# Patient Record
Sex: MALE | Race: BLACK OR AFRICAN AMERICAN | NOT HISPANIC OR LATINO
[De-identification: names, ages, dates, MRNs, and addresses within clinical notes are randomized per-mention and may not be internally consistent; named-entity substitution may affect disease eponyms.]

---

## 2019-04-02 ENCOUNTER — APPOINTMENT (OUTPATIENT)
Dept: HEART AND VASCULAR | Facility: CLINIC | Age: 84
End: 2019-04-02
Payer: MEDICARE

## 2019-04-02 PROCEDURE — 99213 OFFICE O/P EST LOW 20 MIN: CPT

## 2019-07-11 ENCOUNTER — APPOINTMENT (OUTPATIENT)
Dept: UROLOGY | Facility: CLINIC | Age: 84
End: 2019-07-11

## 2019-07-18 ENCOUNTER — APPOINTMENT (OUTPATIENT)
Dept: UROLOGY | Facility: CLINIC | Age: 84
End: 2019-07-18
Payer: MEDICARE

## 2019-07-18 DIAGNOSIS — R97.20 ELEVATED PROSTATE, SPECIFIC ANTIGEN [PSA]: ICD-10-CM

## 2019-07-18 PROCEDURE — 99204 OFFICE O/P NEW MOD 45 MIN: CPT

## 2019-07-18 NOTE — HISTORY OF PRESENT ILLNESS
[FreeTextEntry1] : 89M CAD BPH referred with PSA 13. comes in with complaints of penile pain and tenderness. occasionl pain. rare dysuria. no hematuria. no fevers chills. no nausea vomting. no family history prostte kidney bladder cancer. on hytrin 10 daily

## 2019-07-18 NOTE — ASSESSMENT
[FreeTextEntry1] : elevated PSA In a 89 year old\par poor PSA screening candidate\par unclear if harboring prostate cancer\par if he is - would be a good watchful wating candidate regardless\par will repeat PSA 6-12 months to confirm trend\par based on trend may consider further diagnostic testing if needed only\par trial betamethasone\par f/u 6months

## 2019-07-18 NOTE — LETTER BODY
[Dear  ___] : Dear  [unfilled], [FreeTextEntry2] : Ramesh Venegas MD\par [address] \[place] [address] [FreeTextEntry1] : I had the pleasure of seeing your patient Darnell Simon in the office in consultation today. Please see the attached note for full details.\par \par Thank you very much for allowing me to participate in the care of this patient. If you have any questions please feel free to call me at any time. \par \par \par Sincerely yours,\par \par \par \par Jose Peck MD, FELICE\par Director, Male Fertility and Microsurgery\par  of Urology\par [facility]\par \par \par This letter has been dictated using computer software but not reviewed to expedite transmission.\par

## 2019-07-18 NOTE — PHYSICAL EXAM
[General Appearance - Well Developed] : well developed [General Appearance - Well Nourished] : well nourished [Normal Appearance] : normal appearance [Well Groomed] : well groomed [Heart Rate And Rhythm] : Heart rate and rhythm were normal [] : no respiratory distress [Abdomen Soft] : soft [Abdomen Tenderness] : non-tender [Abdomen Hernia] : no hernia was discovered [Costovertebral Angle Tenderness] : no ~M costovertebral angle tenderness [Urethral Meatus] : meatus normal [Urinary Bladder Findings] : the bladder was normal on palpation [Scrotum] : the scrotum was normal [Normal Station and Gait] : the gait and station were normal for the patient's age [Skin Color & Pigmentation] : normal skin color and pigmentation [No Focal Deficits] : no focal deficits [Oriented To Time, Place, And Person] : oriented to person, place, and time [No Palpable Adenopathy] : no palpable adenopathy [FreeTextEntry1] : tight phimosis

## 2019-07-24 ENCOUNTER — MEDICATION RENEWAL (OUTPATIENT)
Age: 84
End: 2019-07-24

## 2019-10-18 ENCOUNTER — INPATIENT (INPATIENT)
Facility: HOSPITAL | Age: 84
LOS: 5 days | Discharge: NURSING, REHAB OR LONG-TERM CARE | DRG: 445 | End: 2019-10-24
Attending: GENERAL ACUTE CARE HOSPITAL | Admitting: GENERAL ACUTE CARE HOSPITAL
Payer: MEDICARE

## 2019-10-18 VITALS
HEIGHT: 65 IN | WEIGHT: 160.06 LBS | DIASTOLIC BLOOD PRESSURE: 50 MMHG | OXYGEN SATURATION: 98 % | RESPIRATION RATE: 16 BRPM | HEART RATE: 101 BPM | SYSTOLIC BLOOD PRESSURE: 87 MMHG | TEMPERATURE: 97 F

## 2019-10-18 DIAGNOSIS — R33.9 RETENTION OF URINE, UNSPECIFIED: ICD-10-CM

## 2019-10-18 DIAGNOSIS — K81.0 ACUTE CHOLECYSTITIS: ICD-10-CM

## 2019-10-18 DIAGNOSIS — B96.20 UNSPECIFIED ESCHERICHIA COLI [E. COLI] AS THE CAUSE OF DISEASES CLASSIFIED ELSEWHERE: ICD-10-CM

## 2019-10-18 DIAGNOSIS — R78.81 BACTEREMIA: ICD-10-CM

## 2019-10-18 LAB
ALBUMIN SERPL ELPH-MCNC: 3.3 G/DL — SIGNIFICANT CHANGE UP (ref 3.3–5)
ALP SERPL-CCNC: 270 U/L — HIGH (ref 40–120)
ALT FLD-CCNC: 355 U/L — HIGH (ref 10–45)
ANION GAP SERPL CALC-SCNC: 16 MMOL/L — SIGNIFICANT CHANGE UP (ref 5–17)
APPEARANCE UR: CLEAR — SIGNIFICANT CHANGE UP
APTT BLD: 36.5 SEC — HIGH (ref 27.5–36.3)
AST SERPL-CCNC: 360 U/L — HIGH (ref 10–40)
BASOPHILS # BLD AUTO: 0 K/UL — SIGNIFICANT CHANGE UP (ref 0–0.2)
BASOPHILS NFR BLD AUTO: 0 % — SIGNIFICANT CHANGE UP (ref 0–2)
BILIRUB SERPL-MCNC: 1.9 MG/DL — HIGH (ref 0.2–1.2)
BILIRUB UR-MCNC: ABNORMAL
BUN SERPL-MCNC: 40 MG/DL — HIGH (ref 7–23)
CALCIUM SERPL-MCNC: 8.4 MG/DL — SIGNIFICANT CHANGE UP (ref 8.4–10.5)
CHLORIDE SERPL-SCNC: 108 MMOL/L — SIGNIFICANT CHANGE UP (ref 96–108)
CO2 SERPL-SCNC: 18 MMOL/L — LOW (ref 22–31)
COLOR SPEC: YELLOW — SIGNIFICANT CHANGE UP
CREAT SERPL-MCNC: 2.41 MG/DL — HIGH (ref 0.5–1.3)
DIFF PNL FLD: ABNORMAL
EOSINOPHIL # BLD AUTO: 0 K/UL — SIGNIFICANT CHANGE UP (ref 0–0.5)
EOSINOPHIL NFR BLD AUTO: 0 % — SIGNIFICANT CHANGE UP (ref 0–6)
GAS PNL BLDV: SIGNIFICANT CHANGE UP
GLUCOSE SERPL-MCNC: 71 MG/DL — SIGNIFICANT CHANGE UP (ref 70–99)
GLUCOSE UR QL: NEGATIVE — SIGNIFICANT CHANGE UP
HCT VFR BLD CALC: 29.9 % — LOW (ref 39–50)
HGB BLD-MCNC: 9.4 G/DL — LOW (ref 13–17)
INR BLD: 1.57 — HIGH (ref 0.88–1.16)
KETONES UR-MCNC: ABNORMAL MG/DL
LACTATE SERPL-SCNC: 1.9 MMOL/L — SIGNIFICANT CHANGE UP (ref 0.5–2)
LACTATE SERPL-SCNC: 4 MMOL/L — CRITICAL HIGH (ref 0.5–2)
LEUKOCYTE ESTERASE UR-ACNC: NEGATIVE — SIGNIFICANT CHANGE UP
LYMPHOCYTES # BLD AUTO: 0.82 K/UL — LOW (ref 1–3.3)
LYMPHOCYTES # BLD AUTO: 5.2 % — LOW (ref 13–44)
MCHC RBC-ENTMCNC: 28.1 PG — SIGNIFICANT CHANGE UP (ref 27–34)
MCHC RBC-ENTMCNC: 31.4 GM/DL — LOW (ref 32–36)
MCV RBC AUTO: 89.5 FL — SIGNIFICANT CHANGE UP (ref 80–100)
MONOCYTES # BLD AUTO: 0.14 K/UL — SIGNIFICANT CHANGE UP (ref 0–0.9)
MONOCYTES NFR BLD AUTO: 0.9 % — LOW (ref 2–14)
NEUTROPHILS # BLD AUTO: 12.4 K/UL — HIGH (ref 1.8–7.4)
NEUTROPHILS NFR BLD AUTO: 56.5 % — SIGNIFICANT CHANGE UP (ref 43–77)
NITRITE UR-MCNC: NEGATIVE — SIGNIFICANT CHANGE UP
PH UR: 6 — SIGNIFICANT CHANGE UP (ref 5–8)
PLATELET # BLD AUTO: 104 K/UL — LOW (ref 150–400)
POTASSIUM SERPL-MCNC: 4.3 MMOL/L — SIGNIFICANT CHANGE UP (ref 3.5–5.3)
POTASSIUM SERPL-SCNC: 4.3 MMOL/L — SIGNIFICANT CHANGE UP (ref 3.5–5.3)
PROT SERPL-MCNC: 6.5 G/DL — SIGNIFICANT CHANGE UP (ref 6–8.3)
PROT UR-MCNC: 30 MG/DL
PROTHROM AB SERPL-ACNC: 18 SEC — HIGH (ref 10–12.9)
RBC # BLD: 3.34 M/UL — LOW (ref 4.2–5.8)
RBC # FLD: 14.6 % — HIGH (ref 10.3–14.5)
SODIUM SERPL-SCNC: 142 MMOL/L — SIGNIFICANT CHANGE UP (ref 135–145)
SP GR SPEC: 1.02 — SIGNIFICANT CHANGE UP (ref 1–1.03)
UROBILINOGEN FLD QL: 2 E.U./DL
WBC # BLD: 15.68 K/UL — HIGH (ref 3.8–10.5)
WBC # FLD AUTO: 15.68 K/UL — HIGH (ref 3.8–10.5)

## 2019-10-18 PROCEDURE — 74177 CT ABD & PELVIS W/CONTRAST: CPT | Mod: 26

## 2019-10-18 PROCEDURE — 93010 ELECTROCARDIOGRAM REPORT: CPT

## 2019-10-18 PROCEDURE — 76705 ECHO EXAM OF ABDOMEN: CPT | Mod: 26

## 2019-10-18 PROCEDURE — 99292 CRITICAL CARE ADDL 30 MIN: CPT

## 2019-10-18 PROCEDURE — 99291 CRITICAL CARE FIRST HOUR: CPT

## 2019-10-18 PROCEDURE — 71045 X-RAY EXAM CHEST 1 VIEW: CPT | Mod: 26

## 2019-10-18 RX ORDER — SODIUM CHLORIDE 9 MG/ML
2300 INJECTION INTRAMUSCULAR; INTRAVENOUS; SUBCUTANEOUS ONCE
Refills: 0 | Status: COMPLETED | OUTPATIENT
Start: 2019-10-18 | End: 2019-10-18

## 2019-10-18 RX ORDER — CEFTRIAXONE 500 MG/1
1000 INJECTION, POWDER, FOR SOLUTION INTRAMUSCULAR; INTRAVENOUS ONCE
Refills: 0 | Status: COMPLETED | OUTPATIENT
Start: 2019-10-18 | End: 2019-10-18

## 2019-10-18 RX ORDER — SODIUM CHLORIDE 9 MG/ML
500 INJECTION INTRAMUSCULAR; INTRAVENOUS; SUBCUTANEOUS ONCE
Refills: 0 | Status: COMPLETED | OUTPATIENT
Start: 2019-10-18 | End: 2019-10-18

## 2019-10-18 RX ORDER — METRONIDAZOLE 500 MG
500 TABLET ORAL ONCE
Refills: 0 | Status: COMPLETED | OUTPATIENT
Start: 2019-10-18 | End: 2019-10-18

## 2019-10-18 RX ADMIN — Medication 100 MILLIGRAM(S): at 18:26

## 2019-10-18 RX ADMIN — SODIUM CHLORIDE 2300 MILLILITER(S): 9 INJECTION INTRAMUSCULAR; INTRAVENOUS; SUBCUTANEOUS at 19:06

## 2019-10-18 RX ADMIN — CEFTRIAXONE 1000 MILLIGRAM(S): 500 INJECTION, POWDER, FOR SOLUTION INTRAMUSCULAR; INTRAVENOUS at 19:06

## 2019-10-18 RX ADMIN — SODIUM CHLORIDE 2300 MILLILITER(S): 9 INJECTION INTRAMUSCULAR; INTRAVENOUS; SUBCUTANEOUS at 16:34

## 2019-10-18 RX ADMIN — CEFTRIAXONE 100 MILLIGRAM(S): 500 INJECTION, POWDER, FOR SOLUTION INTRAMUSCULAR; INTRAVENOUS at 16:43

## 2019-10-18 NOTE — ED PROVIDER NOTE - CLINICAL SUMMARY MEDICAL DECISION MAKING FREE TEXT BOX
Pt w noted hypotension, poor historian, some abd pain noted in ruq, rest of abdomen soft, sepsis evaluation completed, acute catrachita on US  w elevated LFTs, lactate, disc w surgery for admission.

## 2019-10-18 NOTE — ED ADULT NURSE NOTE - NSIMPLEMENTINTERV_GEN_ALL_ED
Implemented All Fall with Harm Risk Interventions:  Hurricane to call system. Call bell, personal items and telephone within reach. Instruct patient to call for assistance. Room bathroom lighting operational. Non-slip footwear when patient is off stretcher. Physically safe environment: no spills, clutter or unnecessary equipment. Stretcher in lowest position, wheels locked, appropriate side rails in place. Provide visual cue, wrist band, yellow gown, etc. Monitor gait and stability. Monitor for mental status changes and reorient to person, place, and time. Review medications for side effects contributing to fall risk. Reinforce activity limits and safety measures with patient and family. Provide visual clues: red socks.

## 2019-10-18 NOTE — ED ADULT NURSE NOTE - OBJECTIVE STATEMENT
Pt brought by wheelchair in front triage, with c/o of dizziness, light headed, SOB with exertion. onset was a couple of weeks.  does not note any sudden new changes of speech. he denies fever, chills, urinary s/s, loss of appetite, unilateral weakness, vision changes. Pt is a poor historian with medical HX. he stats he does have HTN and took his BP medication today. Denies fall or LOC

## 2019-10-18 NOTE — ED PROVIDER NOTE - OBJECTIVE STATEMENT
89 yo poor historian w difficulty walking, slurred speech, dizziness for the last three days. no other complaints. 89 yo poor historian hx of htn w lightheadedness dizziness weakness for the last three days. no other complaints. Step daughter does not have medication list, unclear what medications pt is taking. Normally ambulatory w walker, no change in gait. No slurring speech, headache, chest pain, some mild assoc cough noted. no fever. Lives at home alone with friends/family helping intermittently.

## 2019-10-18 NOTE — ED ADULT TRIAGE NOTE - ARRIVAL INFO ADDITIONAL COMMENTS
c.o dizziness, chest pain upon exertion, and right knee pain for 3 days. denies any injuries, sob, visual changes. steady gait noted.

## 2019-10-18 NOTE — ED PROVIDER NOTE - PHYSICAL EXAMINATION
CONSTITUTIONAL: Well appearing, well nourished, awake, alert and in no apparent distress.  HEENT: Head is atraumatic. Eyes clear bilaterally, normal EOMI. Airway patent.  CARDIAC: Normal rate, regular rhythm.  Heart sounds S1, S2.   RESPIRATORY: Breath sounds clear and equal bilaterally. no tachypnea, respiratory distress.   GASTROINTESTINAL: Abdomen soft, non-tender, no guarding, distension.  MUSCULOSKELETAL: Spine appears normal, no midline spinal tenderness, range of motion is not limited, no muscle or joint tenderness. no bony tenderness. no JVD, peripheral edema.   NEUROLOGICAL: Alert and oriented, no focal deficits, no motor or sensory deficits.  SKIN: Skin normal color for race, warm, dry and intact. No evidence of rash.  PSYCHIATRIC: Alert and oriented to person, place, time/situation. normal mood and affect. no apparent risk to self or others. CONSTITUTIONAL: Well appearing, well nourished, awake, alert and in no apparent distress.  HEENT: Head is atraumatic. Eyes clear bilaterally, normal EOMI. Airway patent.  CARDIAC: Normal rate, regular rhythm.  Heart sounds S1, S2.   RESPIRATORY: Breath sounds clear and equal bilaterally. no tachypnea, respiratory distress.   GASTROINTESTINAL: Abdomen soft, ruq tenderness, no guarding, distension.  MUSCULOSKELETAL: Spine appears normal, no midline spinal tenderness, range of motion is not limited, no muscle or joint tenderness. no bony tenderness. no JVD, peripheral edema.   NEUROLOGICAL: Alert and oriented, no focal deficits, no motor or sensory deficits. ambulating slowly but steady.  SKIN: Skin normal color for race, warm, dry and intact. No evidence of rash.  PSYCHIATRIC: Alert and oriented to person, place, time/situation. normal mood and affect. no apparent risk to self or others.

## 2019-10-19 LAB
-  KPC RESISTANCE GENE: SIGNIFICANT CHANGE UP
ALBUMIN SERPL ELPH-MCNC: 2.5 G/DL — LOW (ref 3.3–5)
ALP SERPL-CCNC: 230 U/L — HIGH (ref 40–120)
ALT FLD-CCNC: 256 U/L — HIGH (ref 10–45)
ANION GAP SERPL CALC-SCNC: 13 MMOL/L — SIGNIFICANT CHANGE UP (ref 5–17)
AST SERPL-CCNC: 213 U/L — HIGH (ref 10–40)
BILIRUB SERPL-MCNC: 1.2 MG/DL — SIGNIFICANT CHANGE UP (ref 0.2–1.2)
BUN SERPL-MCNC: 46 MG/DL — HIGH (ref 7–23)
CALCIUM SERPL-MCNC: 7.7 MG/DL — LOW (ref 8.4–10.5)
CHLORIDE SERPL-SCNC: 113 MMOL/L — HIGH (ref 96–108)
CO2 SERPL-SCNC: 16 MMOL/L — LOW (ref 22–31)
CREAT SERPL-MCNC: 2.05 MG/DL — HIGH (ref 0.5–1.3)
E COLI DNA BLD POS QL NAA+NON-PROBE: SIGNIFICANT CHANGE UP
GLUCOSE SERPL-MCNC: 52 MG/DL — LOW (ref 70–99)
GRAM STN FLD: SIGNIFICANT CHANGE UP
HCT VFR BLD CALC: 29.8 % — LOW (ref 39–50)
HGB BLD-MCNC: 9.4 G/DL — LOW (ref 13–17)
MAGNESIUM SERPL-MCNC: 1.5 MG/DL — LOW (ref 1.6–2.6)
MCHC RBC-ENTMCNC: 28.6 PG — SIGNIFICANT CHANGE UP (ref 27–34)
MCHC RBC-ENTMCNC: 31.5 GM/DL — LOW (ref 32–36)
MCV RBC AUTO: 90.6 FL — SIGNIFICANT CHANGE UP (ref 80–100)
METHOD TYPE: SIGNIFICANT CHANGE UP
NRBC # BLD: 0 /100 WBCS — SIGNIFICANT CHANGE UP (ref 0–0)
PHOSPHATE SERPL-MCNC: 4.2 MG/DL — SIGNIFICANT CHANGE UP (ref 2.5–4.5)
PLATELET # BLD AUTO: 90 K/UL — LOW (ref 150–400)
POTASSIUM SERPL-MCNC: 4.1 MMOL/L — SIGNIFICANT CHANGE UP (ref 3.5–5.3)
POTASSIUM SERPL-SCNC: 4.1 MMOL/L — SIGNIFICANT CHANGE UP (ref 3.5–5.3)
PROT SERPL-MCNC: 5.6 G/DL — LOW (ref 6–8.3)
RBC # BLD: 3.29 M/UL — LOW (ref 4.2–5.8)
RBC # FLD: 14.5 % — SIGNIFICANT CHANGE UP (ref 10.3–14.5)
SODIUM SERPL-SCNC: 142 MMOL/L — SIGNIFICANT CHANGE UP (ref 135–145)
WBC # BLD: 14.58 K/UL — HIGH (ref 3.8–10.5)
WBC # FLD AUTO: 14.58 K/UL — HIGH (ref 3.8–10.5)

## 2019-10-19 PROCEDURE — 47490 INCISION OF GALLBLADDER: CPT

## 2019-10-19 PROCEDURE — 99152 MOD SED SAME PHYS/QHP 5/>YRS: CPT

## 2019-10-19 RX ORDER — HEPARIN SODIUM 5000 [USP'U]/ML
5000 INJECTION INTRAVENOUS; SUBCUTANEOUS EVERY 8 HOURS
Refills: 0 | Status: DISCONTINUED | OUTPATIENT
Start: 2019-10-19 | End: 2019-10-24

## 2019-10-19 RX ORDER — SODIUM CHLORIDE 9 MG/ML
1000 INJECTION, SOLUTION INTRAVENOUS
Refills: 0 | Status: DISCONTINUED | OUTPATIENT
Start: 2019-10-19 | End: 2019-10-21

## 2019-10-19 RX ORDER — MAGNESIUM SULFATE 500 MG/ML
2 VIAL (ML) INJECTION
Refills: 0 | Status: COMPLETED | OUTPATIENT
Start: 2019-10-19 | End: 2019-10-19

## 2019-10-19 RX ORDER — CEFTRIAXONE 500 MG/1
1000 INJECTION, POWDER, FOR SOLUTION INTRAMUSCULAR; INTRAVENOUS EVERY 24 HOURS
Refills: 0 | Status: DISCONTINUED | OUTPATIENT
Start: 2019-10-19 | End: 2019-10-21

## 2019-10-19 RX ORDER — INFLUENZA VIRUS VACCINE 15; 15; 15; 15 UG/.5ML; UG/.5ML; UG/.5ML; UG/.5ML
0.5 SUSPENSION INTRAMUSCULAR ONCE
Refills: 0 | Status: COMPLETED | OUTPATIENT
Start: 2019-10-19 | End: 2019-10-19

## 2019-10-19 RX ORDER — METRONIDAZOLE 500 MG
500 TABLET ORAL EVERY 8 HOURS
Refills: 0 | Status: DISCONTINUED | OUTPATIENT
Start: 2019-10-19 | End: 2019-10-21

## 2019-10-19 RX ADMIN — Medication 100 MILLIGRAM(S): at 21:40

## 2019-10-19 RX ADMIN — HEPARIN SODIUM 5000 UNIT(S): 5000 INJECTION INTRAVENOUS; SUBCUTANEOUS at 14:10

## 2019-10-19 RX ADMIN — Medication 50 GRAM(S): at 16:01

## 2019-10-19 RX ADMIN — SODIUM CHLORIDE 500 MILLILITER(S): 9 INJECTION INTRAMUSCULAR; INTRAVENOUS; SUBCUTANEOUS at 00:30

## 2019-10-19 RX ADMIN — Medication 100 MILLIGRAM(S): at 06:04

## 2019-10-19 RX ADMIN — SODIUM CHLORIDE 75 MILLILITER(S): 9 INJECTION, SOLUTION INTRAVENOUS at 06:04

## 2019-10-19 RX ADMIN — HEPARIN SODIUM 5000 UNIT(S): 5000 INJECTION INTRAVENOUS; SUBCUTANEOUS at 21:40

## 2019-10-19 RX ADMIN — HEPARIN SODIUM 5000 UNIT(S): 5000 INJECTION INTRAVENOUS; SUBCUTANEOUS at 06:07

## 2019-10-19 RX ADMIN — CEFTRIAXONE 100 MILLIGRAM(S): 500 INJECTION, POWDER, FOR SOLUTION INTRAMUSCULAR; INTRAVENOUS at 17:30

## 2019-10-19 RX ADMIN — Medication 100 MILLIGRAM(S): at 14:10

## 2019-10-19 RX ADMIN — Medication 50 GRAM(S): at 19:42

## 2019-10-19 RX ADMIN — SODIUM CHLORIDE 75 MILLILITER(S): 9 INJECTION, SOLUTION INTRAVENOUS at 21:45

## 2019-10-19 NOTE — H&P ADULT - NSHPLABSRESULTS_GEN_ALL_CORE
9.4    15.68 )-----------( 104      ( 18 Oct 2019 16:21 )             29.9      10-18    142  |  108  |  40<H>  ----------------------------<  71  4.3   |  18<L>  |  2.41<H>    Ca    8.4      18 Oct 2019 16:21    TPro  6.5  /  Alb  3.3  /  TBili  1.9<H>  /  DBili  x   /  AST  360<H>  /  ALT  355<H>  /  AlkPhos  270<H>  10-18      < from: US Abdomen Limited (10.18.19 @ 19:36) >      EXAM:  US ABDOMEN LIMITED                          PROCEDURE DATE:  10/18/2019          INTERPRETATION:  CLINICAL INFORMATION: Right upper quadrant pain    COMPARISON: Ultrasound 6/28/2011    TECHNIQUE: Sonography of the abdomen.     FINDINGS:    Liver: Within normal limits. Main portal vein is patent with normal   directional flow.    Bile ducts: Normal caliber. Common bile duct measures 3 mm.     Gallbladder: Borderline thickening of gallbladder wall, measuring up to 4   mm. Mobile cholelithiasis is noted. No: Distention. Negative sonographic   Rebolledo sign. Small pericholecystic fluid.    Pancreas: Limited evaluation, Not visualized.    Right kidney: 9.2 cm. No hydronephrosis. Simple Renal cysts.    Ascites: None.    Aorta and IVC: Visualized portions are within normal limits.      IMPRESSION:     Findings suspicious for acute cholecystitis.    Findings were communicated to Dr. Pimentel on 10/18/2019 at 8:05 pm            Thank you for the opportunity to participate in the care of this patient.        LAZARA HOWARD M.D., ATTENDING RADIOLOGIST  This document has been electronically signed. Oct 18 2019  8:03PM

## 2019-10-19 NOTE — H&P ADULT - NSHPPHYSICALEXAM_GEN_ALL_CORE
General: NAD, resting comfortably  Neuro: AAOX3, EOMI, PERRLA, no scleral icterus  Pulm: minimal rhonchi b/l, breathing comfortably  CV: S1/S2 normal, no murmurs  Abdomen: soft, nondistended, mild RUQ tenderness, Rebolledo's sign negative, no rebound, no guarding  : Bettencourt in place  Extremities: WWP, No LE edema b/l

## 2019-10-19 NOTE — H&P ADULT - HISTORY OF PRESENT ILLNESS
89 M PMH htn (poor historian-no family at bedside), denies PSH, presenting with three days of weakness and abdominal pain. Pt states he started feeling week three days ago with constant generalized abdominal pain, sometimes worse on right side however pt unable to further describe. Pt denies fevers/chills, denies nausea/vomiting, denies chest pain/dyspnea, endorses passing flatus, states he had BM yesterday, reports voiding well without issues. On arrival in ED pt was afebrile hypotensive 80s/50s, HR 80s sating 95% on RA, WBC 15.68, Tbili 1.9,  w/ lactate of 4. Pt underwent RUQ US demonstrated concern for acute cholecystitis In ED pt given 1L bolus LR, started on Ceftriaxone/Flagyl.

## 2019-10-19 NOTE — H&P ADULT - ASSESSMENT
89 M PMH htn (poor historian-no family at bedside), denies PSH, presenting with acute cholecystitis.    Admit to General Surgery telemetry, Dr. Feliciano  Pain/nausea control  IV Ceftriaxone/Flagyl  NPO/IVF  Stat IR consult for urgent Perc catrachita placement  f/u CT Abdomen/Pelvis noncontrast per IR recommendations  Bettencourt in place, monitor urine output  HSQ/SCDs for DVT prophylaxis  AM labs  Discussed with chief resident and Dr. Feliciano

## 2019-10-20 LAB
ALBUMIN SERPL ELPH-MCNC: 2.5 G/DL — LOW (ref 3.3–5)
ALP SERPL-CCNC: 200 U/L — HIGH (ref 40–120)
ALT FLD-CCNC: 162 U/L — HIGH (ref 10–45)
ANION GAP SERPL CALC-SCNC: 14 MMOL/L — SIGNIFICANT CHANGE UP (ref 5–17)
AST SERPL-CCNC: 92 U/L — HIGH (ref 10–40)
BILIRUB DIRECT SERPL-MCNC: 0.2 MG/DL — SIGNIFICANT CHANGE UP (ref 0–0.2)
BILIRUB INDIRECT FLD-MCNC: 0.2 MG/DL — SIGNIFICANT CHANGE UP (ref 0.2–1)
BILIRUB SERPL-MCNC: 0.4 MG/DL — SIGNIFICANT CHANGE UP (ref 0.2–1.2)
BUN SERPL-MCNC: 42 MG/DL — HIGH (ref 7–23)
CALCIUM SERPL-MCNC: 8 MG/DL — LOW (ref 8.4–10.5)
CHLORIDE SERPL-SCNC: 114 MMOL/L — HIGH (ref 96–108)
CO2 SERPL-SCNC: 17 MMOL/L — LOW (ref 22–31)
CREAT SERPL-MCNC: 1.59 MG/DL — HIGH (ref 0.5–1.3)
CULTURE RESULTS: NO GROWTH — SIGNIFICANT CHANGE UP
GLUCOSE BLDC GLUCOMTR-MCNC: 109 MG/DL — HIGH (ref 70–99)
GLUCOSE BLDC GLUCOMTR-MCNC: 115 MG/DL — HIGH (ref 70–99)
GLUCOSE BLDC GLUCOMTR-MCNC: 129 MG/DL — HIGH (ref 70–99)
GLUCOSE BLDC GLUCOMTR-MCNC: 40 MG/DL — CRITICAL LOW (ref 70–99)
GLUCOSE SERPL-MCNC: 42 MG/DL — CRITICAL LOW (ref 70–99)
GRAM STN FLD: SIGNIFICANT CHANGE UP
HCT VFR BLD CALC: 27.8 % — LOW (ref 39–50)
HGB BLD-MCNC: 9 G/DL — LOW (ref 13–17)
MAGNESIUM SERPL-MCNC: 2.7 MG/DL — HIGH (ref 1.6–2.6)
MCHC RBC-ENTMCNC: 28.6 PG — SIGNIFICANT CHANGE UP (ref 27–34)
MCHC RBC-ENTMCNC: 32.4 GM/DL — SIGNIFICANT CHANGE UP (ref 32–36)
MCV RBC AUTO: 88.3 FL — SIGNIFICANT CHANGE UP (ref 80–100)
NRBC # BLD: 0 /100 WBCS — SIGNIFICANT CHANGE UP (ref 0–0)
PHOSPHATE SERPL-MCNC: 3.7 MG/DL — SIGNIFICANT CHANGE UP (ref 2.5–4.5)
PLATELET # BLD AUTO: 90 K/UL — LOW (ref 150–400)
POTASSIUM SERPL-MCNC: 3.7 MMOL/L — SIGNIFICANT CHANGE UP (ref 3.5–5.3)
POTASSIUM SERPL-SCNC: 3.7 MMOL/L — SIGNIFICANT CHANGE UP (ref 3.5–5.3)
PROT SERPL-MCNC: 5.6 G/DL — LOW (ref 6–8.3)
RBC # BLD: 3.15 M/UL — LOW (ref 4.2–5.8)
RBC # FLD: 14.5 % — SIGNIFICANT CHANGE UP (ref 10.3–14.5)
SODIUM SERPL-SCNC: 145 MMOL/L — SIGNIFICANT CHANGE UP (ref 135–145)
SPECIMEN SOURCE: SIGNIFICANT CHANGE UP
SPECIMEN SOURCE: SIGNIFICANT CHANGE UP
WBC # BLD: 17.45 K/UL — HIGH (ref 3.8–10.5)
WBC # FLD AUTO: 17.45 K/UL — HIGH (ref 3.8–10.5)

## 2019-10-20 RX ORDER — DEXTROSE 50 % IN WATER 50 %
12.5 SYRINGE (ML) INTRAVENOUS ONCE
Refills: 0 | Status: DISCONTINUED | OUTPATIENT
Start: 2019-10-20 | End: 2019-10-24

## 2019-10-20 RX ORDER — DEXTROSE 50 % IN WATER 50 %
25 SYRINGE (ML) INTRAVENOUS ONCE
Refills: 0 | Status: COMPLETED | OUTPATIENT
Start: 2019-10-20 | End: 2019-10-20

## 2019-10-20 RX ORDER — DEXTROSE 50 % IN WATER 50 %
25 SYRINGE (ML) INTRAVENOUS ONCE
Refills: 0 | Status: DISCONTINUED | OUTPATIENT
Start: 2019-10-20 | End: 2019-10-24

## 2019-10-20 RX ORDER — DEXTROSE 50 % IN WATER 50 %
15 SYRINGE (ML) INTRAVENOUS ONCE
Refills: 0 | Status: DISCONTINUED | OUTPATIENT
Start: 2019-10-20 | End: 2019-10-24

## 2019-10-20 RX ORDER — DEXTROSE 50 % IN WATER 50 %
12.5 SYRINGE (ML) INTRAVENOUS ONCE
Refills: 0 | Status: COMPLETED | OUTPATIENT
Start: 2019-10-20 | End: 2019-10-20

## 2019-10-20 RX ORDER — HYDROMORPHONE HYDROCHLORIDE 2 MG/ML
0.3 INJECTION INTRAMUSCULAR; INTRAVENOUS; SUBCUTANEOUS EVERY 6 HOURS
Refills: 0 | Status: DISCONTINUED | OUTPATIENT
Start: 2019-10-20 | End: 2019-10-22

## 2019-10-20 RX ORDER — SODIUM CHLORIDE 9 MG/ML
1000 INJECTION, SOLUTION INTRAVENOUS
Refills: 0 | Status: DISCONTINUED | OUTPATIENT
Start: 2019-10-20 | End: 2019-10-24

## 2019-10-20 RX ORDER — HYDROMORPHONE HYDROCHLORIDE 2 MG/ML
0.5 INJECTION INTRAMUSCULAR; INTRAVENOUS; SUBCUTANEOUS EVERY 4 HOURS
Refills: 0 | Status: DISCONTINUED | OUTPATIENT
Start: 2019-10-20 | End: 2019-10-22

## 2019-10-20 RX ORDER — MAGNESIUM SULFATE 500 MG/ML
2 VIAL (ML) INJECTION EVERY 6 HOURS
Refills: 0 | Status: COMPLETED | OUTPATIENT
Start: 2019-10-20 | End: 2019-10-20

## 2019-10-20 RX ORDER — INSULIN LISPRO 100/ML
VIAL (ML) SUBCUTANEOUS
Refills: 0 | Status: DISCONTINUED | OUTPATIENT
Start: 2019-10-20 | End: 2019-10-24

## 2019-10-20 RX ORDER — GLUCAGON INJECTION, SOLUTION 0.5 MG/.1ML
1 INJECTION, SOLUTION SUBCUTANEOUS ONCE
Refills: 0 | Status: DISCONTINUED | OUTPATIENT
Start: 2019-10-20 | End: 2019-10-24

## 2019-10-20 RX ADMIN — HYDROMORPHONE HYDROCHLORIDE 0.5 MILLIGRAM(S): 2 INJECTION INTRAMUSCULAR; INTRAVENOUS; SUBCUTANEOUS at 17:20

## 2019-10-20 RX ADMIN — CEFTRIAXONE 100 MILLIGRAM(S): 500 INJECTION, POWDER, FOR SOLUTION INTRAMUSCULAR; INTRAVENOUS at 15:33

## 2019-10-20 RX ADMIN — Medication 100 MILLIGRAM(S): at 21:33

## 2019-10-20 RX ADMIN — HEPARIN SODIUM 5000 UNIT(S): 5000 INJECTION INTRAVENOUS; SUBCUTANEOUS at 21:33

## 2019-10-20 RX ADMIN — Medication 100 MILLIGRAM(S): at 05:50

## 2019-10-20 RX ADMIN — SODIUM CHLORIDE 75 MILLILITER(S): 9 INJECTION, SOLUTION INTRAVENOUS at 15:34

## 2019-10-20 RX ADMIN — Medication 25 GRAM(S): at 10:05

## 2019-10-20 RX ADMIN — Medication 50 GRAM(S): at 16:45

## 2019-10-20 RX ADMIN — Medication 100 MILLIGRAM(S): at 13:15

## 2019-10-20 RX ADMIN — HEPARIN SODIUM 5000 UNIT(S): 5000 INJECTION INTRAVENOUS; SUBCUTANEOUS at 13:15

## 2019-10-20 RX ADMIN — Medication 50 GRAM(S): at 09:57

## 2019-10-20 RX ADMIN — HYDROMORPHONE HYDROCHLORIDE 0.5 MILLIGRAM(S): 2 INJECTION INTRAMUSCULAR; INTRAVENOUS; SUBCUTANEOUS at 16:44

## 2019-10-20 RX ADMIN — HEPARIN SODIUM 5000 UNIT(S): 5000 INJECTION INTRAVENOUS; SUBCUTANEOUS at 05:49

## 2019-10-20 NOTE — PROVIDER CONTACT NOTE (CHANGE IN STATUS NOTIFICATION) - RECOMMENDATIONS
orders patient ot be on fingerstick. I recommended fingerstick order to compare. I gave an amp of D50% for f/s. will recheck f/s in 15mns. orders patient ot be on fingerstick. I recommended fingerstick order to compare. I gave an amp of D50% for f/s. will recheck f/s in 15mns. 1029 am- F/s rechecked 115

## 2019-10-21 LAB
-  AMIKACIN: SIGNIFICANT CHANGE UP
-  AMPICILLIN/SULBACTAM: SIGNIFICANT CHANGE UP
-  AMPICILLIN/SULBACTAM: SIGNIFICANT CHANGE UP
-  AMPICILLIN: SIGNIFICANT CHANGE UP
-  AMPICILLIN: SIGNIFICANT CHANGE UP
-  AZTREONAM: SIGNIFICANT CHANGE UP
-  CEFAZOLIN: SIGNIFICANT CHANGE UP
-  CEFAZOLIN: SIGNIFICANT CHANGE UP
-  CEFEPIME: SIGNIFICANT CHANGE UP
-  CEFOTAXIME: SIGNIFICANT CHANGE UP
-  CEFOXITIN: SIGNIFICANT CHANGE UP
-  CEFTAZIDIME: SIGNIFICANT CHANGE UP
-  CEFTRIAXONE: SIGNIFICANT CHANGE UP
-  CEFTRIAXONE: SIGNIFICANT CHANGE UP
-  CEFUROXIME: SIGNIFICANT CHANGE UP
-  CIPROFLOXACIN: SIGNIFICANT CHANGE UP
-  ERTAPENEM: SIGNIFICANT CHANGE UP
-  GENTAMICIN: SIGNIFICANT CHANGE UP
-  GENTAMICIN: SIGNIFICANT CHANGE UP
-  LEVOFLOXACIN: SIGNIFICANT CHANGE UP
-  MEROPENEM: SIGNIFICANT CHANGE UP
-  MOXIFLOXACIN(AEROBIC): SIGNIFICANT CHANGE UP
-  PIPERACILLIN/TAZOBACTAM: SIGNIFICANT CHANGE UP
-  PIPERACILLIN/TAZOBACTAM: SIGNIFICANT CHANGE UP
-  TETRACYCLINE: SIGNIFICANT CHANGE UP
-  TIGECYCLINE: SIGNIFICANT CHANGE UP
-  TOBRAMYCIN: SIGNIFICANT CHANGE UP
-  TOBRAMYCIN: SIGNIFICANT CHANGE UP
-  TRIMETHOPRIM/SULFAMETHOXAZOLE: SIGNIFICANT CHANGE UP
-  TRIMETHOPRIM/SULFAMETHOXAZOLE: SIGNIFICANT CHANGE UP
ALBUMIN SERPL ELPH-MCNC: 2.2 G/DL — LOW (ref 3.3–5)
ALP SERPL-CCNC: 180 U/L — HIGH (ref 40–120)
ALT FLD-CCNC: 125 U/L — HIGH (ref 10–45)
ANION GAP SERPL CALC-SCNC: 9 MMOL/L — SIGNIFICANT CHANGE UP (ref 5–17)
AST SERPL-CCNC: 51 U/L — HIGH (ref 10–40)
BILIRUB SERPL-MCNC: 0.4 MG/DL — SIGNIFICANT CHANGE UP (ref 0.2–1.2)
BUN SERPL-MCNC: 33 MG/DL — HIGH (ref 7–23)
CALCIUM SERPL-MCNC: 8.1 MG/DL — LOW (ref 8.4–10.5)
CHLORIDE SERPL-SCNC: 110 MMOL/L — HIGH (ref 96–108)
CO2 SERPL-SCNC: 21 MMOL/L — LOW (ref 22–31)
CREAT SERPL-MCNC: 1.37 MG/DL — HIGH (ref 0.5–1.3)
GLUCOSE BLDC GLUCOMTR-MCNC: 123 MG/DL — HIGH (ref 70–99)
GLUCOSE BLDC GLUCOMTR-MCNC: 143 MG/DL — HIGH (ref 70–99)
GLUCOSE BLDC GLUCOMTR-MCNC: 181 MG/DL — HIGH (ref 70–99)
GLUCOSE BLDC GLUCOMTR-MCNC: 82 MG/DL — SIGNIFICANT CHANGE UP (ref 70–99)
GLUCOSE SERPL-MCNC: 95 MG/DL — SIGNIFICANT CHANGE UP (ref 70–99)
HBA1C BLD-MCNC: 5 % — SIGNIFICANT CHANGE UP (ref 4–5.6)
HCT VFR BLD CALC: 27.3 % — LOW (ref 39–50)
HGB BLD-MCNC: 8.7 G/DL — LOW (ref 13–17)
MAGNESIUM SERPL-MCNC: 3.1 MG/DL — HIGH (ref 1.6–2.6)
MCHC RBC-ENTMCNC: 28 PG — SIGNIFICANT CHANGE UP (ref 27–34)
MCHC RBC-ENTMCNC: 31.9 GM/DL — LOW (ref 32–36)
MCV RBC AUTO: 87.8 FL — SIGNIFICANT CHANGE UP (ref 80–100)
METHOD TYPE: SIGNIFICANT CHANGE UP
NRBC # BLD: 0 /100 WBCS — SIGNIFICANT CHANGE UP (ref 0–0)
PHOSPHATE SERPL-MCNC: 2.8 MG/DL — SIGNIFICANT CHANGE UP (ref 2.5–4.5)
PLATELET # BLD AUTO: 98 K/UL — LOW (ref 150–400)
POTASSIUM SERPL-MCNC: 3.6 MMOL/L — SIGNIFICANT CHANGE UP (ref 3.5–5.3)
POTASSIUM SERPL-SCNC: 3.6 MMOL/L — SIGNIFICANT CHANGE UP (ref 3.5–5.3)
PROT SERPL-MCNC: 5.4 G/DL — LOW (ref 6–8.3)
RBC # BLD: 3.11 M/UL — LOW (ref 4.2–5.8)
RBC # FLD: 14.3 % — SIGNIFICANT CHANGE UP (ref 10.3–14.5)
SODIUM SERPL-SCNC: 140 MMOL/L — SIGNIFICANT CHANGE UP (ref 135–145)
WBC # BLD: 9.39 K/UL — SIGNIFICANT CHANGE UP (ref 3.8–10.5)
WBC # FLD AUTO: 9.39 K/UL — SIGNIFICANT CHANGE UP (ref 3.8–10.5)

## 2019-10-21 PROCEDURE — 99232 SBSQ HOSP IP/OBS MODERATE 35: CPT

## 2019-10-21 RX ORDER — POTASSIUM CHLORIDE 20 MEQ
40 PACKET (EA) ORAL ONCE
Refills: 0 | Status: COMPLETED | OUTPATIENT
Start: 2019-10-21 | End: 2019-10-21

## 2019-10-21 RX ORDER — HYDRALAZINE HCL 50 MG
10 TABLET ORAL ONCE
Refills: 0 | Status: DISCONTINUED | OUTPATIENT
Start: 2019-10-21 | End: 2019-10-21

## 2019-10-21 RX ADMIN — CEFTRIAXONE 100 MILLIGRAM(S): 500 INJECTION, POWDER, FOR SOLUTION INTRAMUSCULAR; INTRAVENOUS at 17:10

## 2019-10-21 RX ADMIN — HEPARIN SODIUM 5000 UNIT(S): 5000 INJECTION INTRAVENOUS; SUBCUTANEOUS at 15:10

## 2019-10-21 RX ADMIN — Medication 1: at 12:04

## 2019-10-21 RX ADMIN — Medication 40 MILLIEQUIVALENT(S): at 19:44

## 2019-10-21 RX ADMIN — Medication 1 TABLET(S): at 21:33

## 2019-10-21 RX ADMIN — HYDROMORPHONE HYDROCHLORIDE 0.5 MILLIGRAM(S): 2 INJECTION INTRAMUSCULAR; INTRAVENOUS; SUBCUTANEOUS at 11:29

## 2019-10-21 RX ADMIN — HEPARIN SODIUM 5000 UNIT(S): 5000 INJECTION INTRAVENOUS; SUBCUTANEOUS at 05:21

## 2019-10-21 RX ADMIN — HEPARIN SODIUM 5000 UNIT(S): 5000 INJECTION INTRAVENOUS; SUBCUTANEOUS at 21:33

## 2019-10-21 RX ADMIN — HYDROMORPHONE HYDROCHLORIDE 0.5 MILLIGRAM(S): 2 INJECTION INTRAMUSCULAR; INTRAVENOUS; SUBCUTANEOUS at 12:00

## 2019-10-21 RX ADMIN — Medication 100 MILLIGRAM(S): at 05:21

## 2019-10-21 RX ADMIN — Medication 100 MILLIGRAM(S): at 15:13

## 2019-10-21 NOTE — PHYSICAL THERAPY INITIAL EVALUATION ADULT - DISCHARGE PLANNER MADE AWARE
For information on Fall & Injury Prevention, visit [email or web address]
yes

## 2019-10-21 NOTE — PHYSICAL THERAPY INITIAL EVALUATION ADULT - PERTINENT HX OF CURRENT PROBLEM, REHAB EVAL
89M presenting with three days of weakness and abdominal pain. Pt states he started feeling week three days ago with constant generalized abdominal pain, sometimes worse on right side however pt unable to further describe.

## 2019-10-21 NOTE — PHYSICAL THERAPY INITIAL EVALUATION ADULT - GAIT DEVIATIONS NOTED, PT EVAL
decreased swing-to-stance ratio/decreased step length/decreased jean paul/decreased weight-shifting ability/crouch like gait, dec R step length, slight freezing 2/2 "locking" of R knee, narrow MAKAYLA, slightly unsteady

## 2019-10-22 LAB
-  AMPICILLIN/SULBACTAM: SIGNIFICANT CHANGE UP
-  AMPICILLIN: SIGNIFICANT CHANGE UP
-  CEFAZOLIN: SIGNIFICANT CHANGE UP
-  CEFTRIAXONE: SIGNIFICANT CHANGE UP
-  CIPROFLOXACIN: SIGNIFICANT CHANGE UP
-  CIPROFLOXACIN: SIGNIFICANT CHANGE UP
-  GENTAMICIN: SIGNIFICANT CHANGE UP
-  PIPERACILLIN/TAZOBACTAM: SIGNIFICANT CHANGE UP
-  TOBRAMYCIN: SIGNIFICANT CHANGE UP
-  TRIMETHOPRIM/SULFAMETHOXAZOLE: SIGNIFICANT CHANGE UP
ALBUMIN SERPL ELPH-MCNC: 2.3 G/DL — LOW (ref 3.3–5)
ALP SERPL-CCNC: 174 U/L — HIGH (ref 40–120)
ALT FLD-CCNC: 87 U/L — HIGH (ref 10–45)
ANION GAP SERPL CALC-SCNC: 9 MMOL/L — SIGNIFICANT CHANGE UP (ref 5–17)
APTT BLD: 42.3 SEC — HIGH (ref 27.5–36.3)
AST SERPL-CCNC: 33 U/L — SIGNIFICANT CHANGE UP (ref 10–40)
BILIRUB SERPL-MCNC: 0.4 MG/DL — SIGNIFICANT CHANGE UP (ref 0.2–1.2)
BUN SERPL-MCNC: 22 MG/DL — SIGNIFICANT CHANGE UP (ref 7–23)
CALCIUM SERPL-MCNC: 8.1 MG/DL — LOW (ref 8.4–10.5)
CHLORIDE SERPL-SCNC: 109 MMOL/L — HIGH (ref 96–108)
CO2 SERPL-SCNC: 21 MMOL/L — LOW (ref 22–31)
CREAT SERPL-MCNC: 1.16 MG/DL — SIGNIFICANT CHANGE UP (ref 0.5–1.3)
CULTURE RESULTS: SIGNIFICANT CHANGE UP
GLUCOSE BLDC GLUCOMTR-MCNC: 110 MG/DL — HIGH (ref 70–99)
GLUCOSE BLDC GLUCOMTR-MCNC: 110 MG/DL — HIGH (ref 70–99)
GLUCOSE BLDC GLUCOMTR-MCNC: 122 MG/DL — HIGH (ref 70–99)
GLUCOSE BLDC GLUCOMTR-MCNC: 128 MG/DL — HIGH (ref 70–99)
GLUCOSE SERPL-MCNC: 103 MG/DL — HIGH (ref 70–99)
HCT VFR BLD CALC: 29.1 % — LOW (ref 39–50)
HGB BLD-MCNC: 9.2 G/DL — LOW (ref 13–17)
INR BLD: 1.23 — HIGH (ref 0.88–1.16)
MAGNESIUM SERPL-MCNC: 2.3 MG/DL — SIGNIFICANT CHANGE UP (ref 1.6–2.6)
MCHC RBC-ENTMCNC: 28 PG — SIGNIFICANT CHANGE UP (ref 27–34)
MCHC RBC-ENTMCNC: 31.6 GM/DL — LOW (ref 32–36)
MCV RBC AUTO: 88.7 FL — SIGNIFICANT CHANGE UP (ref 80–100)
METHOD TYPE: SIGNIFICANT CHANGE UP
METHOD TYPE: SIGNIFICANT CHANGE UP
NRBC # BLD: 0 /100 WBCS — SIGNIFICANT CHANGE UP (ref 0–0)
ORGANISM # SPEC MICROSCOPIC CNT: SIGNIFICANT CHANGE UP
PHOSPHATE SERPL-MCNC: 2.6 MG/DL — SIGNIFICANT CHANGE UP (ref 2.5–4.5)
PLATELET # BLD AUTO: 102 K/UL — LOW (ref 150–400)
POTASSIUM SERPL-MCNC: 3.5 MMOL/L — SIGNIFICANT CHANGE UP (ref 3.5–5.3)
POTASSIUM SERPL-SCNC: 3.5 MMOL/L — SIGNIFICANT CHANGE UP (ref 3.5–5.3)
PROT SERPL-MCNC: 5.6 G/DL — LOW (ref 6–8.3)
PROTHROM AB SERPL-ACNC: 14 SEC — HIGH (ref 10–12.9)
RBC # BLD: 3.28 M/UL — LOW (ref 4.2–5.8)
RBC # FLD: 14.3 % — SIGNIFICANT CHANGE UP (ref 10.3–14.5)
SODIUM SERPL-SCNC: 139 MMOL/L — SIGNIFICANT CHANGE UP (ref 135–145)
SPECIMEN SOURCE: SIGNIFICANT CHANGE UP
WBC # BLD: 7.84 K/UL — SIGNIFICANT CHANGE UP (ref 3.8–10.5)
WBC # FLD AUTO: 7.84 K/UL — SIGNIFICANT CHANGE UP (ref 3.8–10.5)

## 2019-10-22 PROCEDURE — 99232 SBSQ HOSP IP/OBS MODERATE 35: CPT

## 2019-10-22 PROCEDURE — 99222 1ST HOSP IP/OBS MODERATE 55: CPT

## 2019-10-22 RX ORDER — CEFTRIAXONE 500 MG/1
1000 INJECTION, POWDER, FOR SOLUTION INTRAMUSCULAR; INTRAVENOUS EVERY 24 HOURS
Refills: 0 | Status: DISCONTINUED | OUTPATIENT
Start: 2019-10-22 | End: 2019-10-23

## 2019-10-22 RX ORDER — POTASSIUM PHOSPHATE, MONOBASIC POTASSIUM PHOSPHATE, DIBASIC 236; 224 MG/ML; MG/ML
15 INJECTION, SOLUTION INTRAVENOUS ONCE
Refills: 0 | Status: COMPLETED | OUTPATIENT
Start: 2019-10-22 | End: 2019-10-22

## 2019-10-22 RX ORDER — SENNA PLUS 8.6 MG/1
2 TABLET ORAL AT BEDTIME
Refills: 0 | Status: DISCONTINUED | OUTPATIENT
Start: 2019-10-22 | End: 2019-10-24

## 2019-10-22 RX ORDER — TAMSULOSIN HYDROCHLORIDE 0.4 MG/1
0.4 CAPSULE ORAL AT BEDTIME
Refills: 0 | Status: DISCONTINUED | OUTPATIENT
Start: 2019-10-22 | End: 2019-10-24

## 2019-10-22 RX ORDER — METRONIDAZOLE 500 MG
500 TABLET ORAL EVERY 8 HOURS
Refills: 0 | Status: DISCONTINUED | OUTPATIENT
Start: 2019-10-22 | End: 2019-10-24

## 2019-10-22 RX ORDER — OXYCODONE AND ACETAMINOPHEN 5; 325 MG/1; MG/1
1 TABLET ORAL EVERY 6 HOURS
Refills: 0 | Status: DISCONTINUED | OUTPATIENT
Start: 2019-10-22 | End: 2019-10-24

## 2019-10-22 RX ORDER — TAMSULOSIN HYDROCHLORIDE 0.4 MG/1
0.4 CAPSULE ORAL ONCE
Refills: 0 | Status: COMPLETED | OUTPATIENT
Start: 2019-10-22 | End: 2019-10-22

## 2019-10-22 RX ADMIN — TAMSULOSIN HYDROCHLORIDE 0.4 MILLIGRAM(S): 0.4 CAPSULE ORAL at 11:42

## 2019-10-22 RX ADMIN — CEFTRIAXONE 100 MILLIGRAM(S): 500 INJECTION, POWDER, FOR SOLUTION INTRAMUSCULAR; INTRAVENOUS at 11:40

## 2019-10-22 RX ADMIN — Medication 1 TABLET(S): at 09:15

## 2019-10-22 RX ADMIN — HEPARIN SODIUM 5000 UNIT(S): 5000 INJECTION INTRAVENOUS; SUBCUTANEOUS at 06:38

## 2019-10-22 RX ADMIN — SENNA PLUS 2 TABLET(S): 8.6 TABLET ORAL at 22:01

## 2019-10-22 RX ADMIN — HEPARIN SODIUM 5000 UNIT(S): 5000 INJECTION INTRAVENOUS; SUBCUTANEOUS at 13:10

## 2019-10-22 RX ADMIN — HEPARIN SODIUM 5000 UNIT(S): 5000 INJECTION INTRAVENOUS; SUBCUTANEOUS at 22:00

## 2019-10-22 RX ADMIN — TAMSULOSIN HYDROCHLORIDE 0.4 MILLIGRAM(S): 0.4 CAPSULE ORAL at 22:00

## 2019-10-22 RX ADMIN — Medication 100 MILLIGRAM(S): at 22:00

## 2019-10-22 RX ADMIN — POTASSIUM PHOSPHATE, MONOBASIC POTASSIUM PHOSPHATE, DIBASIC 63.75 MILLIMOLE(S): 236; 224 INJECTION, SOLUTION INTRAVENOUS at 11:15

## 2019-10-22 RX ADMIN — Medication 100 MILLIGRAM(S): at 13:08

## 2019-10-22 NOTE — CONSULT NOTE ADULT - SUBJECTIVE AND OBJECTIVE BOX
CONSULT NOTE:    HPI:  89 M PMH htn (poor historian-no family at bedside), admitted to general surgery service for cholecystitis underwent drainage with IR 10/18. Called to see patient due to urinary retention. Per primary team stroud was removed 10/21, patient was unable to urinate had bedside pvrs done (300-400) and was straight cathed. Per team and nursing staff patient was not uncomfortable at those times.  Per patient he has never seen a urologist before and denies any urologic history. He states at home he urinate about 3 times during the day last time at about 11 pm and then not again till 5 am. He states sometimes it takes a while to get the urine out but eventually he is able to . He states he just urinated now and it felt like it normally does for him at home. He denies any urgency or frequency, denies any dysuria or hematuria. He states that when they tell him his bladder is full and straight cath him that he does not have any urge to urinate at those times. He denies constipation and states he has not been ambulating as much as he does at home.     Vital Signs Last 24 Hrs  T(C): 37.1 (22 Oct 2019 10:48), Max: 37.1 (21 Oct 2019 17:40)  T(F): 98.8 (22 Oct 2019 10:48), Max: 98.8 (22 Oct 2019 10:48)  HR: 84 (22 Oct 2019 08:16) (68 - 84)  BP: 163/83 (22 Oct 2019 08:16) (145/62 - 168/73)  BP(mean): 115 (22 Oct 2019 08:16) (89 - 115)  RR: 18 (22 Oct 2019 08:16) (16 - 18)  SpO2: 98% (22 Oct 2019 08:16) (97% - 99%)  I&O's Summary    21 Oct 2019 07:01  -  22 Oct 2019 07:00  --------------------------------------------------------  IN: 610 mL / OUT: 1780 mL / NET: -1170 mL    22 Oct 2019 07:01  -  22 Oct 2019 13:22  --------------------------------------------------------  IN: 840 mL / OUT: 0 mL / NET: 840 mL        PE:  Gen: NAD  Abd: soft,  nd, nt  : voiding clear urine       LABS:                        9.2    7.84  )-----------( 102      ( 22 Oct 2019 07:50 )             29.1     10-22    139  |  109<H>  |  22  ----------------------------<  103<H>  3.5   |  21<L>  |  1.16    Ca    8.1<L>      22 Oct 2019 07:50  Phos  2.6     10-22  Mg     2.3     10-22    TPro  5.6<L>  /  Alb  2.3<L>  /  TBili  0.4  /  DBili  x   /  AST  33  /  ALT  87<H>  /  AlkPhos  174<H>  10-22    PT/INR - ( 22 Oct 2019 07:50 )   PT: 14.0 sec;   INR: 1.23          PTT - ( 22 Oct 2019 07:50 )  PTT:42.3 sec  Cultures  Culture Results:   Numerous Escherichia coli  Numerous Escherichia coli #2  Mixed anaerobes including:  Moderate Bacteroides fragilis Beta lactamase positive  Moderate Bacteroides species, not  fragilis Group (nordii) Beta lactamase  positive  Few Bacteroides thetaiotaomicron Beta lactamase positive (10-19 @ 07:39)  Culture Results:   No growth (10-18 @ 21:07)  Culture Results:   No growth at 3 days. (10-18 @ 17:32)  Culture Results:   Growth in anaerobic bottle: Escherichia coli (10-18 @ 17:32)      A/P 89 yo m with elevated residuals  1) UA negative  2) rec flomax  3) Rec OOB / PT consult  4) patient should stand when urinating  5) monitor post void residuals, if cr remains stable, and patient not uncomfortable, can just monitor residuals no stroud  CONSULT NOTE:    HPI:  89 M PMH htn (poor historian-no family at bedside), admitted to general surgery service for cholecystitis underwent drainage with IR 10/18. Called to see patient due to urinary retention. Per primary team stroud was removed 10/21, patient was unable to urinate had bedside pvrs done (300-400) and was straight cathed. Per team and nursing staff patient was not uncomfortable at those times.  Per patient he has never seen a urologist before and denies any urologic history. He states at home he urinate about 3 times during the day last time at about 11 pm and then not again till 5 am. He states sometimes it takes a while to get the urine out but eventually he is able to . He states he just urinated now and it felt like it normally does for him at home. He denies any urgency or frequency, denies any dysuria or hematuria. He states that when they tell him his bladder is full and straight cath him that he does not have any urge to urinate at those times. He denies constipation and states he has not been ambulating as much as he does at home.     Vital Signs Last 24 Hrs  T(C): 37.1 (22 Oct 2019 10:48), Max: 37.1 (21 Oct 2019 17:40)  T(F): 98.8 (22 Oct 2019 10:48), Max: 98.8 (22 Oct 2019 10:48)  HR: 84 (22 Oct 2019 08:16) (68 - 84)  BP: 163/83 (22 Oct 2019 08:16) (145/62 - 168/73)  BP(mean): 115 (22 Oct 2019 08:16) (89 - 115)  RR: 18 (22 Oct 2019 08:16) (16 - 18)  SpO2: 98% (22 Oct 2019 08:16) (97% - 99%)  I&O's Summary    21 Oct 2019 07:01  -  22 Oct 2019 07:00  --------------------------------------------------------  IN: 610 mL / OUT: 1780 mL / NET: -1170 mL    22 Oct 2019 07:01  -  22 Oct 2019 13:22  --------------------------------------------------------  IN: 840 mL / OUT: 0 mL / NET: 840 mL        PE:  Gen: NAD  Abd: soft,  nd, nt  : voiding clear urine       LABS:                        9.2    7.84  )-----------( 102      ( 22 Oct 2019 07:50 )             29.1     10-22    139  |  109<H>  |  22  ----------------------------<  103<H>  3.5   |  21<L>  |  1.16    Ca    8.1<L>      22 Oct 2019 07:50  Phos  2.6     10-22  Mg     2.3     10-22    TPro  5.6<L>  /  Alb  2.3<L>  /  TBili  0.4  /  DBili  x   /  AST  33  /  ALT  87<H>  /  AlkPhos  174<H>  10-22    PT/INR - ( 22 Oct 2019 07:50 )   PT: 14.0 sec;   INR: 1.23          PTT - ( 22 Oct 2019 07:50 )  PTT:42.3 sec  Cultures  Culture Results:   Numerous Escherichia coli  Numerous Escherichia coli #2  Mixed anaerobes including:  Moderate Bacteroides fragilis Beta lactamase positive  Moderate Bacteroides species, not  fragilis Group (nordii) Beta lactamase  positive  Few Bacteroides thetaiotaomicron Beta lactamase positive (10-19 @ 07:39)  Culture Results:   No growth (10-18 @ 21:07)  Culture Results:   No growth at 3 days. (10-18 @ 17:32)  Culture Results:   Growth in anaerobic bottle: Escherichia coli (10-18 @ 17:32)      A/P 89 yo m with elevated residuals  1) UA negative  2) rec flomax  3) Rec OOB / PT consult  4) patient should stand when urinating  5) monitor post void residuals, if cr remains stable, and patient not uncomfortable, can just monitor residuals no stroud    ADDEndum:  -If elevated residuals persist would rec stroud and dc with leg bag follow up as outpatient

## 2019-10-22 NOTE — CONSULT NOTE ADULT - ASSESSMENT
89M with PMH HTN, no previous surgical history presented with acute cholecystitis, s/p Perc catrachita, found to have E. coli bacteremia likely 2/2 cholecystitis. E.coli in body fluid noted to be resistant to fluoroquinolones      Recommendations:   - F/u repeat blood cultures  - patient now with appropriate source control with perc choley drain, to be managed by surgery. If downtrending output, surgery team will likely remove drain.   - if BCx negative, patient may be discharged on Keflex 500mg q12 (for 2 additional weeks from negative blood culture)  - continue Flagyl 500mg PO q8h (10/18- ) for an additional 2 more days for a total course of 7 days.      ID team 1 to follow.   Plan discussed with Dr. Miguel and surgery team 89M with PMH HTN, no previous surgical history presented with acute cholecystitis, s/p Perc catrachita, found to have E. coli bacteremia likely 2/2 cholecystitis. E.coli in body fluid noted to be resistant to fluoroquinolones      Recommendations:   - F/u repeat blood cultures  - patient now with appropriate source control with perc choley drain, to be managed by surgery. If downtrending output, surgery team will likely remove drain.   - continue Ceftriaxone, however would increase to 2g q24h and continue flagyl 500mg q8h (may switch to PO)   - if BCx negative, patient may be discharged on Keflex 500mg q12 (for 2 additional weeks from negative blood culture)  - continue Flagyl 500mg PO q8h (10/18- ) for an additional 2 more days for a total course of 7 days.      ID team 1 to follow.   Plan discussed with Dr. Miguel and surgery team 89M with PMH HTN, no previous surgical history presented with acute cholecystitis, s/p Perc catrachita, found to have E. coli bacteremia likely 2/2 cholecystitis. E.coli in body fluid noted to be resistant to fluoroquinolones      Recommendations:   - F/u repeat blood cultures  - patient now with appropriate source control with perc choley drain, to be managed by surgery. If downtrending output, surgery team will likely remove drain.   - continue Ceftriaxone, however would increase to 2g q24h and continue flagyl 500mg q8h (may switch to PO)   - if BCx negative, patient may be discharged on Keflex 500 mg q12 (for 2 additional weeks from negative blood culture)  - continue Flagyl 500mg PO q8h (10/18- ) for an additional 2 more days for a total course of 7 days.      ID team 1 to follow.   Plan discussed with Dr. Miguel and surgery team

## 2019-10-22 NOTE — CONSULT NOTE ADULT - SUBJECTIVE AND OBJECTIVE BOX
Consultation Requested by:    Patient is a 89y old  Male who presents with a chief complaint of Acute Cholecystitis (22 Oct 2019 13:21)    HPI:  89 M PMH htn (poor historian-no family at bedside), denies PSH, presenting with three days of weakness and abdominal pain. Pt states he started feeling week three days ago with constant generalized abdominal pain, sometimes worse on right side however pt unable to further describe. Pt denies fevers/chills, denies nausea/vomiting, denies chest pain/dyspnea, endorses passing flatus, states he had BM yesterday, reports voiding well without issues. On arrival in ED pt was afebrile hypotensive 80s/50s, HR 80s sating 95% on RA, WBC 15.68, Tbili 1.9,  w/ lactate of 4. Pt underwent RUQ US demonstrated concern for acute cholecystitis In ED pt given 1L bolus LR, started on Ceftriaxone/Flagyl. (19 Oct 2019 02:15)      REVIEW OF SYSTEMS  All review of systems negative, except for those marked:  General:		[] Abnormal:  	[] Night Sweats		[] Fever		[] Weight Loss  Pulmonary/Cough:	[] Abnormal:  Cardiac/Chest Pain:	[] Abnormal:  Gastrointestinal:   	[] Abnormal:  Eyes:			        [] Abnormal:  ENT:		         	[] Abnormal:  Dysuria:		                [] Abnormal:  Musculoskeletal	:	[] Abnormal:  Endocrine:		        [] Abnormal:  Lymph Nodes:		[] Abnormal:  Headache:		        [] Abnormal:  Skin:			        [] Abnormal:  Allergy/Immune:       	[] Abnormal:  Psychiatric:		        [] Abnormal:  [] All other review of systems negative  [] Unable to obtain (explain):    Recent Ill Contacts:	[] No	[] Yes:  Recent Travel History:	[] No	[] Yes:  Recent Animal/Insect Exposure/Tick Bites:	[] No	[] Yes:    Allergies    No Known Allergies    Intolerances      Antimicrobials:  cefTRIAXone   IVPB 1000 milliGRAM(s) IV Intermittent every 24 hours  metroNIDAZOLE  IVPB 500 milliGRAM(s) IV Intermittent every 8 hours      Other Medications:  dextrose 40% Gel 15 Gram(s) Oral once PRN  dextrose 5%. 1000 milliLiter(s) IV Continuous <Continuous>  dextrose 50% Injectable 12.5 Gram(s) IV Push once  dextrose 50% Injectable 25 Gram(s) IV Push once  dextrose 50% Injectable 25 Gram(s) IV Push once  glucagon  Injectable 1 milliGRAM(s) IntraMuscular once PRN  heparin  Injectable 5000 Unit(s) SubCutaneous every 8 hours  HYDROmorphone  Injectable 0.5 milliGRAM(s) IV Push every 4 hours PRN  HYDROmorphone  Injectable 0.3 milliGRAM(s) IV Push every 6 hours PRN  influenza   Vaccine 0.5 milliLiter(s) IntraMuscular once  insulin lispro (HumaLOG) corrective regimen sliding scale   SubCutaneous Before meals and at bedtime  tamsulosin 0.4 milliGRAM(s) Oral at bedtime      FAMILY HISTORY:    PAST MEDICAL & SURGICAL HISTORY:    SOCIAL HISTORY:    IMMUNIZATIONS  [] Up to Date		[] Not Up to Date:  Recent Immunizations:	[] No	[] Yes:    Daily     Daily   Head Circumference:  Vital Signs Last 24 Hrs  T(C): 36.6 (22 Oct 2019 14:12), Max: 37.1 (21 Oct 2019 17:40)  T(F): 97.9 (22 Oct 2019 14:12), Max: 98.8 (22 Oct 2019 10:48)  HR: 77 (22 Oct 2019 11:30) (68 - 84)  BP: 155/69 (22 Oct 2019 11:30) (145/62 - 163/83)  BP(mean): 99 (22 Oct 2019 11:30) (89 - 115)  RR: 18 (22 Oct 2019 11:30) (16 - 18)  SpO2: 97% (22 Oct 2019 11:30) (97% - 99%)    PHYSICAL EXAM  General : NAD, resting comfortably   HEENT: moist mucus membranes, oropharynx clear  Heart: Regular rhythm, regular rate, S1/S2 present  Respiratory: CTA bilaterally, no wheezes  Abd: soft non-tender, non-distended, bowel sounds present. RUQ perc choley drain draining bilious fluid  MSK: WWP, no LE edema   Neuro: alert and oriented x3, no focal deficits.     Lab Results:                        9.2    7.84  )-----------( 102      ( 22 Oct 2019 07:50 )             29.1     10-22    139  |  109<H>  |  22  ----------------------------<  103<H>  3.5   |  21<L>  |  1.16    Ca    8.1<L>      22 Oct 2019 07:50  Phos  2.6     10-22  Mg     2.3     10-22    TPro  5.6<L>  /  Alb  2.3<L>  /  TBili  0.4  /  DBili  x   /  AST  33  /  ALT  87<H>  /  AlkPhos  174<H>  10-22    LIVER FUNCTIONS - ( 22 Oct 2019 07:50 )  Alb: 2.3 g/dL / Pro: 5.6 g/dL / ALK PHOS: 174 U/L / ALT: 87 U/L / AST: 33 U/L / GGT: x           PT/INR - ( 22 Oct 2019 07:50 )   PT: 14.0 sec;   INR: 1.23          PTT - ( 22 Oct 2019 07:50 )  PTT:42.3 sec      MICROBIOLOGY  Culture - Body Fluid with Gram Stain (10.19.19 @ 07:39)    -  Tobramycin: S <=2    -  Tobramycin: S <=2    -  Trimethoprim/Sulfamethoxazole: S <=0.5/9.5    -  Trimethoprim/Sulfamethoxazole: S <=0.5/9.5    -  Gentamicin: S 2    -  Gentamicin: S 2    -  Levofloxacin: R 32    -  Meropenem: S <=1    -  Moxifloxacin(Aerobic): S <=2    -  Piperacillin/Tazobactam: S <=8    -  Piperacillin/Tazobactam: S <=8    -  Tetra/Doxy: S <=2    -  Tigecycline: S <=1    Gram Stain:   Moderate Gram Negative Rods  Few White blood cells    -  Amikacin: S <=8    -  Ampicillin/Sulbactam: S <=4/2 Enterobacter, Citrobacter, and Serratia may develop resistance during prolonged therapy (3-4 days)    -  Ampicillin/Sulbactam: S <=4/2 Enterobacter, Citrobacter, and Serratia may develop resistance during prolonged therapy (3-4 days)    -  Aztreonam: S <=4    -  Cefazolin: S <=2 Enterobacter, Citrobacter, and Serratia may develop resistance during prolonged therapy (3-4 days)    -  Cefazolin: S <=2 Enterobacter, Citrobacter, and Serratia may develop resistance during prolonged therapy (3-4 days)    -  Cefepime: S <=2    -  Cefotaxime: S <=2    -  Cefoxitin: S <=4    -  Ceftazidime: S <=1    -  Ceftriaxone: S <=1 Enterobacter, Citrobacter, and Serratia may develop resistance during prolonged therapy    -  Ceftriaxone: S <=1 Enterobacter, Citrobacter, and Serratia may develop resistance during prolonged therapy    -  Cefuroxime: S <=4    -  Ciprofloxacin: S    -  Ciprofloxacin: R >2    -  Ciprofloxacin: R    -  Ertapenem: S <=0.5    -  Ampicillin: S 8 These ampicillin results predict results for amoxicillin    -  Ampicillin: S 4 These ampicillin results predict results for amoxicillin    Specimen Source: .Body Fluid Bile/ Gallbladder    Culture Results:   Numerous Escherichia coli  Numerous Escherichia coli #2  Mixed anaerobes including:  Moderate Bacteroides fragilis Beta lactamase positive  Moderate Bacteroides species, not  fragilis Group (nordii) Beta lactamase  positive  Few Bacteroides thetaiotaomicron Beta lactamase positive    Organism Identification: Escherichia coli  Escherichia coli  Escherichia coli  Escherichia coli  Escherichia coli  Escherichia coli    Organism: Escherichia coli    Organism: Escherichia coli    Organism: Escherichia coli    Organism: Escherichia coli    Organism: Escherichia coli    Organism: Escherichia coli    Method Type: ETEST    Method Type: KB    Method Type: LENCHO    Method Type: ETEST    Method Type: LENCHO    Method Type: KB    Culture - Blood (10.18.19 @ 17:32)    -  Multidrug (KPC pos) resistant organism: Nondet    -  Escherichia coli: Detec    Gram Stain:   Anaerobic Bottle: Gram Negative Rods  Result called to and read back by_ JOSELINE Jackson RN ([facility])  10/19/2019  11:27:11  ***Blood Panel PCR results on this specimen are available  approximately 3 hours after the Gram stain result.***  Gram stain, PCR, and/or culture results may not always  correspond due to difference in methodologies.  ************************************************************  This PCR assay was performed using [employer].  The following targets are tested for: Enterococcus,  vancomycin resistant enterococci, Listeria monocytogenes,  coagulase negative staphylococci, S. aureus,  methicillin resistant S. aureus, Streptococcus agalactiae  (Group B), S. pneumoniae, S. pyogenes (Group A),  Acinetobacter baumannii, Enterobacter cloacae, E. coli,  Klebsiella oxytoca, K. pneumoniae, Proteus sp.,  Serratia marcescens, Haemophilus influenzae,  Neisseria meningitidis, Pseudomonas aeruginosa, Candida  albicans, C. glabrata, C krusei, C parapsilosis,  C. tropicalis and the KPC resistance gene.  "Due to technical problems, Proteus sp. will Not be reported as part of  the BCID panel until further notice"    -  Ampicillin: S 8 These ampicillin results predict results for amoxicillin    -  Cefazolin: S <=2 Enterobacter, Citrobacter, and Serratia may develop resistance during prolonged therapy (3-4 days)    -  Ceftriaxone: S <=1 Enterobacter, Citrobacter, and Serratia may develop resistance during prolonged therapy    -  Tobramycin: S <=2    -  Piperacillin/Tazobactam: S <=8    -  Trimethoprim/Sulfamethoxazole: S <=0.5/9.5    -  Gentamicin: S <=1    -  Ampicillin/Sulbactam: S <=4/2 Enterobacter, Citrobacter, and Serratia may develop resistance during prolonged therapy (3-4 days)    Specimen Source: .Blood Blood-Peripheral    Organism: Blood Culture PCR    Organism: Escherichia coli    Culture Results:   Growth in anaerobic bottle: Escherichia coli    Organism Identification: Escherichia coli  Blood Culture PCR    Method Type: PCR    Method Type: LENCHO

## 2019-10-23 ENCOUNTER — TRANSCRIPTION ENCOUNTER (OUTPATIENT)
Age: 84
End: 2019-10-23

## 2019-10-23 DIAGNOSIS — A41.9 SEPSIS, UNSPECIFIED ORGANISM: ICD-10-CM

## 2019-10-23 LAB
ALBUMIN SERPL ELPH-MCNC: 2.2 G/DL — LOW (ref 3.3–5)
ALP SERPL-CCNC: 171 U/L — HIGH (ref 40–120)
ALT FLD-CCNC: 71 U/L — HIGH (ref 10–45)
ANION GAP SERPL CALC-SCNC: 8 MMOL/L — SIGNIFICANT CHANGE UP (ref 5–17)
AST SERPL-CCNC: 43 U/L — HIGH (ref 10–40)
BILIRUB SERPL-MCNC: 0.4 MG/DL — SIGNIFICANT CHANGE UP (ref 0.2–1.2)
BUN SERPL-MCNC: 15 MG/DL — SIGNIFICANT CHANGE UP (ref 7–23)
CALCIUM SERPL-MCNC: 8.3 MG/DL — LOW (ref 8.4–10.5)
CHLORIDE SERPL-SCNC: 109 MMOL/L — HIGH (ref 96–108)
CO2 SERPL-SCNC: 20 MMOL/L — LOW (ref 22–31)
CREAT SERPL-MCNC: 1.08 MG/DL — SIGNIFICANT CHANGE UP (ref 0.5–1.3)
CULTURE RESULTS: SIGNIFICANT CHANGE UP
GLUCOSE BLDC GLUCOMTR-MCNC: 106 MG/DL — HIGH (ref 70–99)
GLUCOSE BLDC GLUCOMTR-MCNC: 108 MG/DL — HIGH (ref 70–99)
GLUCOSE BLDC GLUCOMTR-MCNC: 135 MG/DL — HIGH (ref 70–99)
GLUCOSE BLDC GLUCOMTR-MCNC: 152 MG/DL — HIGH (ref 70–99)
GLUCOSE BLDC GLUCOMTR-MCNC: 52 MG/DL — LOW (ref 70–99)
GLUCOSE SERPL-MCNC: 102 MG/DL — HIGH (ref 70–99)
HCT VFR BLD CALC: 30.9 % — LOW (ref 39–50)
HGB BLD-MCNC: 9.3 G/DL — LOW (ref 13–17)
MAGNESIUM SERPL-MCNC: 2.1 MG/DL — SIGNIFICANT CHANGE UP (ref 1.6–2.6)
MCHC RBC-ENTMCNC: 27.6 PG — SIGNIFICANT CHANGE UP (ref 27–34)
MCHC RBC-ENTMCNC: 30.1 GM/DL — LOW (ref 32–36)
MCV RBC AUTO: 91.7 FL — SIGNIFICANT CHANGE UP (ref 80–100)
NRBC # BLD: 0 /100 WBCS — SIGNIFICANT CHANGE UP (ref 0–0)
PHOSPHATE SERPL-MCNC: 3.1 MG/DL — SIGNIFICANT CHANGE UP (ref 2.5–4.5)
PLATELET # BLD AUTO: 112 K/UL — LOW (ref 150–400)
POTASSIUM SERPL-MCNC: 3.7 MMOL/L — SIGNIFICANT CHANGE UP (ref 3.5–5.3)
POTASSIUM SERPL-SCNC: 3.7 MMOL/L — SIGNIFICANT CHANGE UP (ref 3.5–5.3)
PROT SERPL-MCNC: 5.7 G/DL — LOW (ref 6–8.3)
RBC # BLD: 3.37 M/UL — LOW (ref 4.2–5.8)
RBC # FLD: 14.5 % — SIGNIFICANT CHANGE UP (ref 10.3–14.5)
SODIUM SERPL-SCNC: 137 MMOL/L — SIGNIFICANT CHANGE UP (ref 135–145)
SPECIMEN SOURCE: SIGNIFICANT CHANGE UP
WBC # BLD: 9.18 K/UL — SIGNIFICANT CHANGE UP (ref 3.8–10.5)
WBC # FLD AUTO: 9.18 K/UL — SIGNIFICANT CHANGE UP (ref 3.8–10.5)

## 2019-10-23 PROCEDURE — 99232 SBSQ HOSP IP/OBS MODERATE 35: CPT

## 2019-10-23 PROCEDURE — 99232 SBSQ HOSP IP/OBS MODERATE 35: CPT | Mod: GC

## 2019-10-23 RX ORDER — CEFTRIAXONE 500 MG/1
2000 INJECTION, POWDER, FOR SOLUTION INTRAMUSCULAR; INTRAVENOUS EVERY 24 HOURS
Refills: 0 | Status: DISCONTINUED | OUTPATIENT
Start: 2019-10-24 | End: 2019-10-24

## 2019-10-23 RX ORDER — POTASSIUM CHLORIDE 20 MEQ
40 PACKET (EA) ORAL ONCE
Refills: 0 | Status: COMPLETED | OUTPATIENT
Start: 2019-10-23 | End: 2019-10-23

## 2019-10-23 RX ADMIN — HEPARIN SODIUM 5000 UNIT(S): 5000 INJECTION INTRAVENOUS; SUBCUTANEOUS at 21:43

## 2019-10-23 RX ADMIN — Medication 100 MILLIGRAM(S): at 21:42

## 2019-10-23 RX ADMIN — TAMSULOSIN HYDROCHLORIDE 0.4 MILLIGRAM(S): 0.4 CAPSULE ORAL at 21:43

## 2019-10-23 RX ADMIN — SENNA PLUS 2 TABLET(S): 8.6 TABLET ORAL at 21:43

## 2019-10-23 RX ADMIN — Medication 40 MILLIEQUIVALENT(S): at 09:00

## 2019-10-23 RX ADMIN — Medication 100 MILLIGRAM(S): at 05:46

## 2019-10-23 RX ADMIN — OXYCODONE AND ACETAMINOPHEN 1 TABLET(S): 5; 325 TABLET ORAL at 14:55

## 2019-10-23 RX ADMIN — HEPARIN SODIUM 5000 UNIT(S): 5000 INJECTION INTRAVENOUS; SUBCUTANEOUS at 14:55

## 2019-10-23 RX ADMIN — CEFTRIAXONE 100 MILLIGRAM(S): 500 INJECTION, POWDER, FOR SOLUTION INTRAMUSCULAR; INTRAVENOUS at 09:00

## 2019-10-23 RX ADMIN — Medication 100 MILLIGRAM(S): at 12:58

## 2019-10-23 RX ADMIN — OXYCODONE AND ACETAMINOPHEN 1 TABLET(S): 5; 325 TABLET ORAL at 15:55

## 2019-10-23 RX ADMIN — HEPARIN SODIUM 5000 UNIT(S): 5000 INJECTION INTRAVENOUS; SUBCUTANEOUS at 05:47

## 2019-10-23 RX ADMIN — Medication 1: at 21:43

## 2019-10-23 NOTE — DISCHARGE NOTE PROVIDER - CARE PROVIDERS DIRECT ADDRESSES
,DirectAddress_Unknown,[email or web address]

## 2019-10-23 NOTE — DISCHARGE NOTE PROVIDER - NSDCFUSCHEDAPPT_GEN_ALL_CORE_FT
EARNEST RENAE ; 01/14/2020 ; P Urology [address] [place] [address] EARNEST RENAE ; 01/14/2020 ; P Urology [address] [place] [address]

## 2019-10-23 NOTE — DISCHARGE NOTE PROVIDER - CARE PROVIDER_API CALL
Cruz Alvarenga)  Surgery  [address] [place] [address]  Phone: [phone]  Fax: [phone]  Follow Up Time: 1 week    Simba Goel)  Diagnostic Radiology; [facility]iology  [address] [place] [address]  Phone: [phone]  Fax: [phone]  Follow Up Time: 2 weeks Cruz Alvarenga)  Surgery  [address] [place] [address]  Phone: [phone]  Fax: [phone]  Follow Up Time: 2 weeks    Simba Goel)  Diagnostic Radiology; [facility]  [address] [place] [address]  Phone: [phone]  Fax: [phone]  Follow Up Time: 2 weeks    Torrey Perry)  Urology  [address] [place] [address]  Phone: [phone]  Fax: [phone]  Follow Up Time: 1 week

## 2019-10-23 NOTE — DISCHARGE NOTE PROVIDER - HOSPITAL COURSE
This is a 89 yo male w PMH of HTN who presented with acute cholecystitis. Based on his age and clinical assessment, he was deemed not to be a surgical candidate. The patient was referred to interventional radiology for a percutaneous cholecystomy, which occurred without complications. The patient's post-procedural course was complicated by urinary retention, necessitating a Urology consult and eventually a stroud placement, but was otherwise uneventful. The patient improved with IV antibiotics and drainage of his gallbladder. At the time of discharge the patient was stable, pain well controlled, tolerating their oral diet and ambulating without issues.

## 2019-10-23 NOTE — DISCHARGE NOTE PROVIDER - NSDCFUADDAPPT_GEN_ALL_CORE_FT
Please follow up with Dr. Alvarenga, Surgery, to be evaluated for possible surgical removal of your gallbladder.     Additionally, please follow up with Interventional Radiology for possible removal of your gallbladder drain. Please follow up with Dr. Alvarenga, Surgery, to be evaluated for possible surgical removal of your gallbladder.     You must also follow up with Dr. Torrey Perry, Urology, to have your urinary retention evaluated.     Additionally, please follow up with Interventional Radiology for possible removal of your gallbladder drain.

## 2019-10-23 NOTE — DIETITIAN INITIAL EVALUATION ADULT. - ENERGY NEEDS
Height: 5'5" Weight: 160lbs, IBW 118lbs+/-10%, %%, BMI 26.6  ABW used for calculations as pt between % of IBW.   Nutrient needs based on [facility] standards of care for maintenance in older adults.   Needs adjusted for age and post-op

## 2019-10-23 NOTE — DIETITIAN INITIAL EVALUATION ADULT. - ADD RECOMMEND
1) Continue on low fat diet  2) Honor pts food preferences  3) Encourage protein options 2/2 increased needs post-op 4) consider Ensure High Protein if intake remains <50%

## 2019-10-23 NOTE — DIETITIAN INITIAL EVALUATION ADULT. - OTHER INFO
89yo M w/ PMH HTN (poor historian, no family at bedside) p/w acute cholecystitis and elevated Tbili. Now s/p percutaneous cholecystostomy tube 10/20. Pt seen in room, resting in bed. Currently on a low fat diet and tolerating PO. Reported having drinks and a banana for breakfast this am. Pt was slightly confused during assessment and unable to fully answer questions. No signs of GI distress. No N/V, cannot recall if he has had a BM. Not appropriate for educating on therapeutic diet at this time. Unable to obtain information on diet/weight history although pt did state he usually weighs 160lbs. No family at bedside. Skin: surgical incision; GI passing flatus per flowsheet. RD to follow.

## 2019-10-24 ENCOUNTER — TRANSCRIPTION ENCOUNTER (OUTPATIENT)
Age: 84
End: 2019-10-24

## 2019-10-24 VITALS — TEMPERATURE: 99 F

## 2019-10-24 LAB
CULTURE RESULTS: SIGNIFICANT CHANGE UP
GLUCOSE BLDC GLUCOMTR-MCNC: 100 MG/DL — HIGH (ref 70–99)
GLUCOSE BLDC GLUCOMTR-MCNC: 108 MG/DL — HIGH (ref 70–99)
ORGANISM # SPEC MICROSCOPIC CNT: SIGNIFICANT CHANGE UP
SPECIMEN SOURCE: SIGNIFICANT CHANGE UP

## 2019-10-24 PROCEDURE — 36415 COLL VENOUS BLD VENIPUNCTURE: CPT

## 2019-10-24 PROCEDURE — 87486 CHLMYD PNEUM DNA AMP PROBE: CPT

## 2019-10-24 PROCEDURE — 93005 ELECTROCARDIOGRAM TRACING: CPT

## 2019-10-24 PROCEDURE — 85027 COMPLETE CBC AUTOMATED: CPT

## 2019-10-24 PROCEDURE — 87150 DNA/RNA AMPLIFIED PROBE: CPT

## 2019-10-24 PROCEDURE — 87070 CULTURE OTHR SPECIMN AEROBIC: CPT

## 2019-10-24 PROCEDURE — 87633 RESP VIRUS 12-25 TARGETS: CPT

## 2019-10-24 PROCEDURE — 47490 INCISION OF GALLBLADDER: CPT

## 2019-10-24 PROCEDURE — 81001 URINALYSIS AUTO W/SCOPE: CPT

## 2019-10-24 PROCEDURE — 87075 CULTR BACTERIA EXCEPT BLOOD: CPT

## 2019-10-24 PROCEDURE — 84132 ASSAY OF SERUM POTASSIUM: CPT

## 2019-10-24 PROCEDURE — 83605 ASSAY OF LACTIC ACID: CPT

## 2019-10-24 PROCEDURE — 80076 HEPATIC FUNCTION PANEL: CPT

## 2019-10-24 PROCEDURE — 83036 HEMOGLOBIN GLYCOSYLATED A1C: CPT

## 2019-10-24 PROCEDURE — 83735 ASSAY OF MAGNESIUM: CPT

## 2019-10-24 PROCEDURE — 84100 ASSAY OF PHOSPHORUS: CPT

## 2019-10-24 PROCEDURE — C1769: CPT

## 2019-10-24 PROCEDURE — 96365 THER/PROPH/DIAG IV INF INIT: CPT

## 2019-10-24 PROCEDURE — 97161 PT EVAL LOW COMPLEX 20 MIN: CPT

## 2019-10-24 PROCEDURE — C1729: CPT

## 2019-10-24 PROCEDURE — 87184 SC STD DISK METHOD PER PLATE: CPT

## 2019-10-24 PROCEDURE — 82803 BLOOD GASES ANY COMBINATION: CPT

## 2019-10-24 PROCEDURE — 80048 BASIC METABOLIC PNL TOTAL CA: CPT

## 2019-10-24 PROCEDURE — 85025 COMPLETE CBC W/AUTO DIFF WBC: CPT

## 2019-10-24 PROCEDURE — 87040 BLOOD CULTURE FOR BACTERIA: CPT

## 2019-10-24 PROCEDURE — 84295 ASSAY OF SERUM SODIUM: CPT

## 2019-10-24 PROCEDURE — 96375 TX/PRO/DX INJ NEW DRUG ADDON: CPT

## 2019-10-24 PROCEDURE — C1894: CPT

## 2019-10-24 PROCEDURE — 87205 SMEAR GRAM STAIN: CPT

## 2019-10-24 PROCEDURE — 74177 CT ABD & PELVIS W/CONTRAST: CPT

## 2019-10-24 PROCEDURE — 87086 URINE CULTURE/COLONY COUNT: CPT

## 2019-10-24 PROCEDURE — 82330 ASSAY OF CALCIUM: CPT

## 2019-10-24 PROCEDURE — 82962 GLUCOSE BLOOD TEST: CPT

## 2019-10-24 PROCEDURE — 99152 MOD SED SAME PHYS/QHP 5/>YRS: CPT

## 2019-10-24 PROCEDURE — 99285 EMERGENCY DEPT VISIT HI MDM: CPT | Mod: 25

## 2019-10-24 PROCEDURE — 87186 SC STD MICRODIL/AGAR DIL: CPT

## 2019-10-24 PROCEDURE — 85610 PROTHROMBIN TIME: CPT

## 2019-10-24 PROCEDURE — 96366 THER/PROPH/DIAG IV INF ADDON: CPT

## 2019-10-24 PROCEDURE — 76705 ECHO EXAM OF ABDOMEN: CPT

## 2019-10-24 PROCEDURE — 87581 M.PNEUMON DNA AMP PROBE: CPT

## 2019-10-24 PROCEDURE — 71045 X-RAY EXAM CHEST 1 VIEW: CPT

## 2019-10-24 PROCEDURE — 87798 DETECT AGENT NOS DNA AMP: CPT

## 2019-10-24 PROCEDURE — 80053 COMPREHEN METABOLIC PANEL: CPT

## 2019-10-24 PROCEDURE — 85730 THROMBOPLASTIN TIME PARTIAL: CPT

## 2019-10-24 PROCEDURE — 99153 MOD SED SAME PHYS/QHP EA: CPT

## 2019-10-24 RX ORDER — SENNA PLUS 8.6 MG/1
2 TABLET ORAL
Qty: 28 | Refills: 0
Start: 2019-10-24 | End: 2019-11-06

## 2019-10-24 RX ORDER — CEPHALEXIN 500 MG
1 CAPSULE ORAL
Qty: 28 | Refills: 0
Start: 2019-10-24 | End: 2019-10-30

## 2019-10-24 RX ORDER — TAMSULOSIN HYDROCHLORIDE 0.4 MG/1
1 CAPSULE ORAL
Qty: 14 | Refills: 0
Start: 2019-10-24 | End: 2019-11-06

## 2019-10-24 RX ORDER — METRONIDAZOLE 500 MG
1 TABLET ORAL
Qty: 21 | Refills: 0
Start: 2019-10-24 | End: 2019-10-30

## 2019-10-24 RX ORDER — TAMSULOSIN HYDROCHLORIDE 0.4 MG/1
1 CAPSULE ORAL
Qty: 0 | Refills: 0 | DISCHARGE
Start: 2019-10-24 | End: 2019-11-06

## 2019-10-24 RX ADMIN — OXYCODONE AND ACETAMINOPHEN 1 TABLET(S): 5; 325 TABLET ORAL at 09:46

## 2019-10-24 RX ADMIN — HEPARIN SODIUM 5000 UNIT(S): 5000 INJECTION INTRAVENOUS; SUBCUTANEOUS at 13:23

## 2019-10-24 RX ADMIN — Medication 100 MILLIGRAM(S): at 13:09

## 2019-10-24 RX ADMIN — Medication 100 MILLIGRAM(S): at 05:59

## 2019-10-24 RX ADMIN — CEFTRIAXONE 100 MILLIGRAM(S): 500 INJECTION, POWDER, FOR SOLUTION INTRAMUSCULAR; INTRAVENOUS at 08:52

## 2019-10-24 RX ADMIN — HEPARIN SODIUM 5000 UNIT(S): 5000 INJECTION INTRAVENOUS; SUBCUTANEOUS at 05:59

## 2019-10-24 RX ADMIN — OXYCODONE AND ACETAMINOPHEN 1 TABLET(S): 5; 325 TABLET ORAL at 08:52

## 2019-10-24 NOTE — DISCHARGE NOTE NURSING/CASE MANAGEMENT/SOCIAL WORK - PATIENT PORTAL LINK FT
You can access the FollowMyHealth Patient Portal offered by [facility] by registering at the following website: [email or web address]. By joining [facility]’s FollowMyHealth portal, you will also be able to view your health information using other applications (apps) compatible with our system.

## 2019-10-24 NOTE — PROGRESS NOTE ADULT - PROVIDER SPECIALTY LIST ADULT
Infectious Disease
Intervent Radiology
Intervent Radiology
Surgery

## 2019-10-24 NOTE — DISCHARGE NOTE NURSING/CASE MANAGEMENT/SOCIAL WORK - NSDCFUADDAPPT_GEN_ALL_CORE_FT
Please follow up with Dr. Alvarenga, Surgery, to be evaluated for possible surgical removal of your gallbladder.     You must also follow up with Dr. Torrey Perry, Urology, to have your urinary retention evaluated.     Additionally, please follow up with Interventional Radiology for possible removal of your gallbladder drain.

## 2019-10-24 NOTE — PROGRESS NOTE ADULT - ASSESSMENT
89 y/o M with hx of acute cholecystitis c/s for percutaneous cholecystostomy tube, stable, with resolution of leukocytosis and downtrending LFTs.  Antibiotic regimen per primary team.  Recommend MRCP for further evaluation of biliary tree if clinical suspicious for residual pathology.  No plan for percutaneous cholangiogram at this time. Drain continues to function well.  IR will continue to follow.    Jose Laird, PGY-2
89 M PMH htn (poor historian-no family at bedside), denies PSH, presenting with acute cholecystitis and elevated Tbilirubin s/p Per catrachita 10/20.     Failed TOV. Will likely need stroud if fails again.     Pain/nausea control  IV Ceftriaxone/Flagyl  low fat diet/IVF  Stroud in place, monitor urine output  HSQ/SCDs for DVT prophylaxis  AM labs  dispo: PT
89 M PMH htn (poor historian-no family at bedside), denies PSH, presenting with acute cholecystitis and elevated Tbilirubin s/p Per catrachita 10/20.     Likely DC today     Pain/nausea control  IV Ceftriaxone/Flagyl  low fat diet/IVF  [place] in place, monitor urine output  HSQ/SCDs for DVT prophylaxis  AM labs  dispo: SKYLAR
89 M PMH htn (poor historian-no family at bedside), denies PSH, presenting with acute cholecystitis and elevated Tbilirubin s/p Per catrachita 10/20.     Pain/nausea control  IV Ceftriaxone/Flagyl  low fat diet/IVF  [place]court in place, monitor urine output  HSQ/SCDs for DVT prophylaxis  AM labs  dispo: SKYLAR
89 M PMH htn (poor historian-no family at bedside), denies PSH, presenting with acute cholecystitis and elevated Tbilirubin s/p Per catrachita 10/20.     Recovering well. Will f/u w IR for possible cholangiogram.     Pain/nausea control  IV Ceftriaxone/Flagyl  low fat diet/IVF  Bet[place]court in place, monitor urine output  HSQ/SCDs for DVT prophylaxis  AM labs
89 M PMH htn (poor historian-no family at bedside), denies PSH, presenting with acute cholecystitis and elevated Tbilirubin.      Pain/nausea control  IV Ceftriaxone/Flagyl  NPO/IVF  Bet[place]court in place, monitor urine output  HSQ/SCDs for DVT prophylaxis  AM labs
89M with PMH HTN, no previous surgical history presented with acute cholecystitis, s/p Perc catrachita, found to have E. coli bacteremia likely 2/2 cholecystitis. E.coli in body fluid noted to be resistant to fluoroquinolones      Recommendations:   - Patient now with appropriate source control with perc choley drain in place. Plan to remove drain is unclear per surgery.   - Continue ceftriaxone, however would increase to 2g q24h and continue flagyl PO 500mg q8h if patient still admitted   - Since repeat BCx ( 10/22) are NGTD:  Pt can be discharged on Keflex 500 mg q12h (for 2 additional weeks from last negative blood culture which would be November 5th)   - continue Flagyl 500mg PO q8h (10/18-10/24 ) for an additional day for a total course of 7 days.      ID team 1 to signoff.
89 y/o M with hx of acute cholecystitis c/s for percutaneous cholecystostomy tube, stable, with resolution of leukocytosis and downtrending LFTs.  Antibiotic regimen per primary team.  Recommend MRCP for further evaluation of biliary tree if clinical suspicious for residual pathology.  No plan for percutaneous cholangiogram at this time.      Plan discussed with attending physician, Dr. Andreina Laird, PGY-2
89 M PMH htn (poor historian-no family at bedside), denies PSH, presenting with acute cholecystitis and elevated Tbilirubin.    Pain/nausea control  IV Ceftriaxone/Flagyl  NPO/IVF  Bettencourt in place, monitor urine output  HSQ/SCDs for DVT prophylaxis  AM labs

## 2019-10-24 NOTE — PROGRESS NOTE ADULT - SUBJECTIVE AND OBJECTIVE BOX
2 days s/p per catrachita     Patient seen and examined at bedside. In no acute distress. Denies N/V. Is tolerating diet, pain well controlled.     OBJECTIVE:    Vital Signs Last 24 Hrs  T(C): 36.4 (20 Oct 2019 05:41), Max: 36.4 (19 Oct 2019 09:25)  T(F): 97.6 (20 Oct 2019 05:41), Max: 97.6 (19 Oct 2019 11:51)  HR: 74 (20 Oct 2019 04:25) (58 - 80)  BP: 145/65 (20 Oct 2019 04:25) (128/59 - 168/73)  BP(mean): 93 (20 Oct 2019 04:25) (85 - 105)  RR: 18 (20 Oct 2019 04:25) (16 - 18)  SpO2: 98% (20 Oct 2019 04:25) (97% - 99%)    I&O's Summary    19 Oct 2019 07:01  -  20 Oct 2019 07:00  --------------------------------------------------------  IN: 1800 mL / OUT: 1155 mL / NET: 645 mL        Physical Exam:  General Appearance: Appears well, NAD  HEENT: Grossly symmetric  Chest: Non labored breathing  CV: Pulse regular presently  Abdomen: Soft, nontender, nondistended  Extremities: Grossly symmetric, no swelling, warm.       LABS:                        9.4    14.58 )-----------( 90       ( 19 Oct 2019 05:48 )             29.8     10    142  |  113<H>  |  46<H>  ----------------------------<  52<L>  4.1   |  16<L>  |  2.05<H>    Ca    7.7<L>      19 Oct 2019 05:48  Phos  4.2     10-19  Mg     1.5     10-19    TPro  5.6<L>  /  Alb  2.5<L>  /  TBili  1.2  /  DBili  x   /  AST  213<H>  /  ALT  256<H>  /  AlkPhos  230<H>  10-19    PT/INR - ( 18 Oct 2019 16:21 )   PT: 18.0 sec;   INR: 1.57          PTT - ( 18 Oct 2019 16:21 )  PTT:36.5 sec  Urinalysis Basic - ( 18 Oct 2019 20:55 )    Color: Yellow / Appearance: Clear / S.025 / pH: x  Gluc: x / Ketone: Trace mg/dL  / Bili: Small / Urobili: 2.0 E.U./dL   Blood: x / Protein: 30 mg/dL / Nitrite: NEGATIVE   Leuk Esterase: NEGATIVE / RBC: < 5 /HPF / WBC < 5 /HPF   Sq Epi: x / Non Sq Epi: 0-5 /HPF / Bacteria: Present /HPF        RADIOLOGY & ADDITIONAL STUDIES:
INTERVAL HPI/OVERNIGHT EVENTS:    OVERNIGHT: No overnight events.Afebrile overnight     SUBJECTIVE: Patient seen and examined at bedside. No new complaints. Patient states that he feels well. Denies any abdominal pain, N,V.    Allergies    No Known Allergies    Intolerances        ANTIBIOTICS/RELEVANT:  antimicrobials  cefTRIAXone   IVPB 1000 milliGRAM(s) IV Intermittent every 24 hours  metroNIDAZOLE  IVPB 500 milliGRAM(s) IV Intermittent every 8 hours    immunologic:  influenza   Vaccine 0.5 milliLiter(s) IntraMuscular once    OTHER:  dextrose 40% Gel 15 Gram(s) Oral once PRN  dextrose 5%. 1000 milliLiter(s) IV Continuous <Continuous>  dextrose 50% Injectable 12.5 Gram(s) IV Push once  dextrose 50% Injectable 25 Gram(s) IV Push once  dextrose 50% Injectable 25 Gram(s) IV Push once  glucagon  Injectable 1 milliGRAM(s) IntraMuscular once PRN  heparin  Injectable 5000 Unit(s) SubCutaneous every 8 hours  insulin lispro (HumaLOG) corrective regimen sliding scale   SubCutaneous Before meals and at bedtime  oxycodone    5 mG/acetaminophen 325 mG 1 Tablet(s) Oral every 6 hours PRN  senna 2 Tablet(s) Oral at bedtime  tamsulosin 0.4 milliGRAM(s) Oral at bedtime      Objective:  Vital Signs Last 24 Hrs  T(C): 36.9 (23 Oct 2019 09:36), Max: 37.3 (23 Oct 2019 05:40)  T(F): 98.4 (23 Oct 2019 09:36), Max: 99.1 (23 Oct 2019 05:40)  HR: 80 (23 Oct 2019 08:33) (65 - 80)  BP: 169/74 (23 Oct 2019 08:33) (155/69 - 169/74)  BP(mean): 107 (23 Oct 2019 08:33) (99 - 107)  RR: 19 (23 Oct 2019 08:33) (17 - 19)  SpO2: 98% (23 Oct 2019 08:33) (97% - 99%)      PHYSICAL EXAM:    General : NAD, resting comfortably in bed   HEENT: moist mucus membranes, oropharynx clear  Heart: Regular rhythm, regular rate, S1/S2 present  Respiratory: CTA bilaterally, no wheezes  Abd: soft non-tender, non-distended, bowel sounds present. RUQ perc choley drain draining bilious fluid  MSK: WWP, no LE edema   Neuro: alert and oriented x3, no focal deficits.       LABS:                        9.3    9.18  )-----------( 112      ( 23 Oct 2019 06:26 )             30.9     10-23    137  |  109<H>  |  15  ----------------------------<  102<H>  3.7   |  20<L>  |  1.08    Ca    8.3<L>      23 Oct 2019 06:26  Phos  3.1     10-23  Mg     2.1     10-23    TPro  5.7<L>  /  Alb  2.2<L>  /  TBili  0.4  /  DBili  x   /  AST  43<H>  /  ALT  71<H>  /  AlkPhos  171<H>  10-23    PT/INR - ( 22 Oct 2019 07:50 )   PT: 14.0 sec;   INR: 1.23          PTT - ( 22 Oct 2019 07:50 )  PTT:42.3 sec      MICROBIOLOGY:            RECENT CULTURES:  10-22 @ 10:54  .Blood blood  --  --  --    No growth at 12 hours  --  10-19 @ 07:39  .Body Fluid Bile/ Gallbladder  Escherichia coli  Escherichia coli  Escherichia coli  Escherichia coli  Escherichia coli  Escherichia coli  Escherichia coli  ETEST    Numerous Escherichia coli  Numerous Escherichia coli #2  Mixed anaerobes including:  Moderate Bacteroides fragilis Beta lactamase positive  Moderate Bacteroides species, not  fragilis Group (nordii) Beta lactamase  positive  Few Bacteroides thetaiotaomicron Beta lactamase positive  --  10-18 @ 21:07  .Urine Clean Catch (Midstream)  --  --  --    No growth  --  10-18 @ 17:32  .Blood Blood-Peripheral  Escherichia coli  Blood Culture PCR  Blood Culture PCR  PCR    No growth at 4 days.  --      RADIOLOGY & ADDITIONAL STUDIES:
No acute events overnight.  Pt afebrile and resolution of previous leukocytosis.  Pt reports improvement in RUQ pain and percutaneous cholecystostomy tube site appears c/d.
No acute events overnight.  Pt seen and examined at bedside.  Pt afebrile.  Percutaneous cholecystostomy drainage bag with bilious appearing output.  Drain flushed with 3cc NS without resistance.
S/p Per Alexandra 10/20    Patient seen and examined at bedside. In no acute distress. Denies N/V. Is tolerating diet, having gas, resting comfortably w/o pain.     OBJECTIVE:    Vital Signs Last 24 Hrs  T(C): 36.6 (21 Oct 2019 05:00), Max: 36.8 (20 Oct 2019 09:22)  T(F): 97.9 (21 Oct 2019 05:00), Max: 98.3 (20 Oct 2019 09:22)  HR: 56 (21 Oct 2019 04:47) (56 - 74)  BP: 143/64 (21 Oct 2019 04:47) (136/61 - 174/96)  BP(mean): 92 (21 Oct 2019 04:47) (88 - 130)  RR: 16 (21 Oct 2019 04:47) (12 - 16)  SpO2: 97% (21 Oct 2019 04:47) (97% - 99%)    I&O's Summary    20 Oct 2019 07:01  -  21 Oct 2019 07:00  --------------------------------------------------------  IN: 2050 mL / OUT: 1630 mL / NET: 420 mL        Physical Exam:  General Appearance: Appears well, NAD  HEENT: Grossly symmetric  Chest: Non labored breathing  CV: Pulse regular presently  Abdomen: Soft, nontender, nondistended  Extremities: Grossly symmetric, no swelling, warm.   FRANCHESKA - Dark bilious fluids     LABS:                        8.7    9.39  )-----------( 98       ( 21 Oct 2019 05:37 )             27.3     10-21    140  |  110<H>  |  33<H>  ----------------------------<  95  3.6   |  21<L>  |  1.37<H>    Ca    8.1<L>      21 Oct 2019 05:37  Phos  2.8     10-21  Mg     3.1     10-21    TPro  5.4<L>  /  Alb  2.2<L>  /  TBili  0.4  /  DBili  x   /  AST  51<H>  /  ALT  125<H>  /  AlkPhos  180<H>  10-21          RADIOLOGY & ADDITIONAL STUDIES:
STATUS POST:      POST OPERATIVE DAY #:     Patient seen and examined at bedside. In no acute distress. Denies N/V. Is tolerating diet, having BM and ambulating.     OBJECTIVE:    Vital Signs Last 24 Hrs  T(C): 36.9 (22 Oct 2019 04:24), Max: 37.1 (21 Oct 2019 17:40)  T(F): 98.5 (22 Oct 2019 04:24), Max: 98.7 (21 Oct 2019 17:40)  HR: 76 (22 Oct 2019 04:45) (66 - 78)  BP: 152/68 (22 Oct 2019 04:45) (123/60 - 168/73)  BP(mean): 98 (22 Oct 2019 04:45) (86 - 106)  RR: 17 (22 Oct 2019 04:45) (16 - 18)  SpO2: 98% (22 Oct 2019 04:45) (97% - 99%)    I&O's Summary    21 Oct 2019 07:01  -  22 Oct 2019 07:00  --------------------------------------------------------  IN: 610 mL / OUT: 1780 mL / NET: -1170 mL        Physical Exam:  General Appearance: Appears well, NAD  HEENT: Grossly symmetric  Chest: Non labored breathing  CV: Pulse regular presently  Abdomen: Soft, nontender, nondistended  Extremities: Grossly symmetric, no swelling, warm.   FRANCHESKA - Brown bilious, clearing up.     LABS:                        8.7    9.39  )-----------( 98       ( 21 Oct 2019 05:37 )             27.3     10-21    140  |  110<H>  |  33<H>  ----------------------------<  95  3.6   |  21<L>  |  1.37<H>    Ca    8.1<L>      21 Oct 2019 05:37  Phos  2.8     10-21  Mg     3.1     10-21    TPro  5.4<L>  /  Alb  2.2<L>  /  TBili  0.4  /  DBili  x   /  AST  51<H>  /  ALT  125<H>  /  AlkPhos  180<H>  10-21          RADIOLOGY & ADDITIONAL STUDIES:
STATUS POST:  10/20: Ras Morris      SUBJECTIVE: Patient seen and examined bedside by chief resident.  feeling well. tolerating diet. pain well controlled     cefTRIAXone   IVPB 1000 milliGRAM(s) IV Intermittent every 24 hours  heparin  Injectable 5000 Unit(s) SubCutaneous every 8 hours  metroNIDAZOLE  IVPB 500 milliGRAM(s) IV Intermittent every 8 hours  tamsulosin 0.4 milliGRAM(s) Oral at bedtime      Vital Signs Last 24 Hrs  T(C): 37.3 (23 Oct 2019 05:40), Max: 37.3 (23 Oct 2019 05:40)  T(F): 99.1 (23 Oct 2019 05:40), Max: 99.1 (23 Oct 2019 05:40)  HR: 65 (23 Oct 2019 05:30) (65 - 84)  BP: 165/74 (23 Oct 2019 05:30) (155/69 - 169/72)  BP(mean): 106 (23 Oct 2019 05:30) (99 - 115)  RR: 18 (23 Oct 2019 05:30) (17 - 18)  SpO2: 99% (23 Oct 2019 05:30) (97% - 99%)  I&O's Detail    22 Oct 2019 07:01  -  23 Oct 2019 07:00  --------------------------------------------------------  IN:    Oral Fluid: 840 mL  Total IN: 840 mL    OUT:    Indwelling Catheter - Urethral: 700 mL    Voided: 200 mL  Total OUT: 900 mL    Total NET: -60 mL          General: NAD, resting comfortably in bed  C/V: NSR  Pulm: Nonlabored breathing, no respiratory distress  Abd: soft, NT/ND. perc catrachita drain in place with output  Extrem: WWP, no edema, SCDs in place        LABS:                        9.3    9.18  )-----------( 112      ( 23 Oct 2019 06:26 )             30.9     10-23    137  |  109<H>  |  15  ----------------------------<  102<H>  3.7   |  20<L>  |  1.08    Ca    8.3<L>      23 Oct 2019 06:26  Phos  3.1     10-23  Mg     2.1     10-23    TPro  5.7<L>  /  Alb  2.2<L>  /  TBili  0.4  /  DBili  x   /  AST  43<H>  /  ALT  71<H>  /  AlkPhos  171<H>  10-23    PT/INR - ( 22 Oct 2019 07:50 )   PT: 14.0 sec;   INR: 1.23          PTT - ( 22 Oct 2019 07:50 )  PTT:42.3 sec      RADIOLOGY & ADDITIONAL STUDIES:
STATUS POST:  Cholecystostomy, percutaneous    Patient seen and examined at bedside. In no acute distress. Denies N/V. Is tolerating diet, having BM and ambulating.     OBJECTIVE:    Vital Signs Last 24 Hrs  T(C): 37.1 (24 Oct 2019 05:36), Max: 37.6 (23 Oct 2019 17:13)  T(F): 98.8 (24 Oct 2019 05:36), Max: 99.7 (23 Oct 2019 17:13)  HR: 68 (24 Oct 2019 03:59) (68 - 80)  BP: 162/75 (24 Oct 2019 03:59) (158/72 - 169/74)  BP(mean): 106 (24 Oct 2019 03:59) (104 - 107)  RR: 18 (24 Oct 2019 03:59) (16 - 19)  SpO2: 99% (24 Oct 2019 03:59) (98% - 100%)    I&O's Summary    23 Oct 2019 07:01  -  24 Oct 2019 07:00  --------------------------------------------------------  IN: 750 mL / OUT: 1435 mL / NET: -685 mL        Physical Exam:  General Appearance: Appears well, NAD  HEENT: Grossly symmetric  Chest: Non labored breathing  CV: Pulse regular presently  Abdomen: Soft, nontender, nondistended  Extremities: Grossly symmetric, no swelling, warm.   FRANCHESKA - Dark brown bilious output.     LABS:                        9.3    9.18  )-----------( 112      ( 23 Oct 2019 06:26 )             30.9     10-23    137  |  109<H>  |  15  ----------------------------<  102<H>  3.7   |  20<L>  |  1.08    Ca    8.3<L>      23 Oct 2019 06:26  Phos  3.1     10-23  Mg     2.1     10-23    TPro  5.7<L>  /  Alb  2.2<L>  /  TBili  0.4  /  DBili  x   /  AST  43<H>  /  ALT  71<H>  /  AlkPhos  171<H>  10-23          RADIOLOGY & ADDITIONAL STUDIES:
24 hr events:  Admitted, underwent perc choly, POC wnl    SUBJECTIVE:  Pt seen and examined at bedside. Pt is doing well, resting comfortably on bed. Pain controlled. +F/+BM. No nausea or vomiting. No complaints at this time.      Vital Signs Last 24 Hrs  T(C): 36.4 (19 Oct 2019 21:47), Max: 36.4 (19 Oct 2019 05:56)  T(F): 97.5 (19 Oct 2019 21:47), Max: 97.6 (19 Oct 2019 11:51)  HR: 68 (19 Oct 2019 16:05) (67 - 83)  BP: 157/69 (19 Oct 2019 16:05) (131/75 - 159/70)  BP(mean): 99 (19 Oct 2019 16:05) (99 - 101)  RR: 18 (19 Oct 2019 16:05) (16 - 18)  SpO2: 98% (19 Oct 2019 16:05) (97% - 100%)    Physical Exam:  General: NAD  Pulmonary: Nonlabored breathing, no respiratory distress  Abdominal: soft, NT/ND, drain with min output  Extremities: WWP,   Neuro: A/O x3,       Lines/drains/tubes:    I&O's Summary    18 Oct 2019 07:  -  19 Oct 2019 07:00  --------------------------------------------------------  IN: 0 mL / OUT: 500 mL / NET: -500 mL    19 Oct 2019 07:  -  19 Oct 2019 23:25  --------------------------------------------------------  IN: 1250 mL / OUT: 650 mL / NET: 600 mL        LABS:                        9.4    14.58 )-----------( 90       ( 19 Oct 2019 05:48 )             29.8     10-    142  |  113<H>  |  46<H>  ----------------------------<  52<L>  4.1   |  16<L>  |  2.05<H>    Ca    7.7<L>      19 Oct 2019 05:48  Phos  4.2     10-19  Mg     1.5     10-19    TPro  5.6<L>  /  Alb  2.5<L>  /  TBili  1.2  /  DBili  x   /  AST  213<H>  /  ALT  256<H>  /  AlkPhos  230<H>  10-19    PT/INR - ( 18 Oct 2019 16:21 )   PT: 18.0 sec;   INR: 1.57          PTT - ( 18 Oct 2019 16:21 )  PTT:36.5 sec  Urinalysis Basic - ( 18 Oct 2019 20:55 )    Color: Yellow / Appearance: Clear / S.025 / pH: x  Gluc: x / Ketone: Trace mg/dL  / Bili: Small / Urobili: 2.0 E.U./dL   Blood: x / Protein: 30 mg/dL / Nitrite: NEGATIVE   Leuk Esterase: NEGATIVE / RBC: < 5 /HPF / WBC < 5 /HPF   Sq Epi: x / Non Sq Epi: 0-5 /HPF / Bacteria: Present /HPF      CAPILLARY BLOOD GLUCOSE        LIVER FUNCTIONS - ( 19 Oct 2019 05:48 )  Alb: 2.5 g/dL / Pro: 5.6 g/dL / ALK PHOS: 230 U/L / ALT: 256 U/L / AST: 213 U/L / GGT: x             RADIOLOGY & ADDITIONAL STUDIES:

## 2019-10-24 NOTE — DISCHARGE NOTE NURSING/CASE MANAGEMENT/SOCIAL WORK - NSDPFAC_GEN_ALL_CORE
[facility] [phone] / [address] [place] [address], [place] [address]

## 2019-10-27 LAB
CULTURE RESULTS: SIGNIFICANT CHANGE UP
CULTURE RESULTS: SIGNIFICANT CHANGE UP
SPECIMEN SOURCE: SIGNIFICANT CHANGE UP
SPECIMEN SOURCE: SIGNIFICANT CHANGE UP

## 2019-11-12 ENCOUNTER — APPOINTMENT (OUTPATIENT)
Dept: BARIATRICS | Facility: CLINIC | Age: 84
End: 2019-11-12

## 2019-11-12 VITALS
HEIGHT: 65 IN | HEART RATE: 87 BPM | DIASTOLIC BLOOD PRESSURE: 70 MMHG | OXYGEN SATURATION: 96 % | SYSTOLIC BLOOD PRESSURE: 138 MMHG | TEMPERATURE: 97.6 F

## 2019-11-12 DIAGNOSIS — K81.9 CHOLECYSTITIS, UNSPECIFIED: ICD-10-CM

## 2019-11-26 ENCOUNTER — APPOINTMENT (OUTPATIENT)
Dept: UROLOGY | Facility: CLINIC | Age: 84
End: 2019-11-26

## 2019-12-10 ENCOUNTER — APPOINTMENT (OUTPATIENT)
Dept: UROLOGY | Facility: CLINIC | Age: 84
End: 2019-12-10
Payer: MEDICARE

## 2019-12-10 VITALS — TEMPERATURE: 98 F | DIASTOLIC BLOOD PRESSURE: 66 MMHG | HEART RATE: 109 BPM | SYSTOLIC BLOOD PRESSURE: 117 MMHG

## 2019-12-10 PROCEDURE — 99213 OFFICE O/P EST LOW 20 MIN: CPT

## 2019-12-10 NOTE — ASSESSMENT
[FreeTextEntry1] : eelvated PSA\par cholecystotomy tube\par will monitor PSA and avoid pnbx given health status \par consider repeat PSA in 6-12 months

## 2019-12-12 ENCOUNTER — EMERGENCY (EMERGENCY)
Facility: HOSPITAL | Age: 84
LOS: 1 days | Discharge: HOME | End: 2019-12-12
Attending: EMERGENCY MEDICINE | Admitting: EMERGENCY MEDICINE
Payer: MEDICARE

## 2019-12-12 VITALS
SYSTOLIC BLOOD PRESSURE: 168 MMHG | TEMPERATURE: 98 F | OXYGEN SATURATION: 100 % | RESPIRATION RATE: 18 BRPM | HEART RATE: 65 BPM | DIASTOLIC BLOOD PRESSURE: 70 MMHG

## 2019-12-12 VITALS
TEMPERATURE: 97 F | DIASTOLIC BLOOD PRESSURE: 66 MMHG | SYSTOLIC BLOOD PRESSURE: 139 MMHG | OXYGEN SATURATION: 100 % | HEART RATE: 84 BPM | WEIGHT: 143.74 LBS | RESPIRATION RATE: 18 BRPM

## 2019-12-12 DIAGNOSIS — Z98.890 OTHER SPECIFIED POSTPROCEDURAL STATES: Chronic | ICD-10-CM

## 2019-12-12 LAB
ALBUMIN SERPL ELPH-MCNC: 2.7 G/DL — LOW (ref 3.3–5)
ALP SERPL-CCNC: 105 U/L — SIGNIFICANT CHANGE UP (ref 40–120)
ALT FLD-CCNC: 7 U/L — LOW (ref 10–45)
ANION GAP SERPL CALC-SCNC: 12 MMOL/L — SIGNIFICANT CHANGE UP (ref 5–17)
APPEARANCE UR: ABNORMAL
AST SERPL-CCNC: 13 U/L — SIGNIFICANT CHANGE UP (ref 10–40)
BACTERIA # UR AUTO: ABNORMAL /HPF
BASOPHILS # BLD AUTO: 0.03 K/UL — SIGNIFICANT CHANGE UP (ref 0–0.2)
BASOPHILS NFR BLD AUTO: 0.3 % — SIGNIFICANT CHANGE UP (ref 0–2)
BILIRUB SERPL-MCNC: 0.2 MG/DL — SIGNIFICANT CHANGE UP (ref 0.2–1.2)
BILIRUB UR-MCNC: NEGATIVE — SIGNIFICANT CHANGE UP
BUN SERPL-MCNC: 36 MG/DL — HIGH (ref 7–23)
CALCIUM SERPL-MCNC: 9 MG/DL — SIGNIFICANT CHANGE UP (ref 8.4–10.5)
CHLORIDE SERPL-SCNC: 110 MMOL/L — HIGH (ref 96–108)
CO2 SERPL-SCNC: 18 MMOL/L — LOW (ref 22–31)
COLOR SPEC: YELLOW — SIGNIFICANT CHANGE UP
CREAT SERPL-MCNC: 1.57 MG/DL — HIGH (ref 0.5–1.3)
DIFF PNL FLD: NEGATIVE — SIGNIFICANT CHANGE UP
EOSINOPHIL # BLD AUTO: 0.21 K/UL — SIGNIFICANT CHANGE UP (ref 0–0.5)
EOSINOPHIL NFR BLD AUTO: 2.3 % — SIGNIFICANT CHANGE UP (ref 0–6)
EPI CELLS # UR: SIGNIFICANT CHANGE UP /HPF (ref 0–5)
GLUCOSE SERPL-MCNC: 102 MG/DL — HIGH (ref 70–99)
GLUCOSE UR QL: NEGATIVE — SIGNIFICANT CHANGE UP
HCT VFR BLD CALC: 27.2 % — LOW (ref 39–50)
HGB BLD-MCNC: 8.1 G/DL — LOW (ref 13–17)
IMM GRANULOCYTES NFR BLD AUTO: 0.8 % — SIGNIFICANT CHANGE UP (ref 0–1.5)
KETONES UR-MCNC: NEGATIVE — SIGNIFICANT CHANGE UP
LEUKOCYTE ESTERASE UR-ACNC: ABNORMAL
LYMPHOCYTES # BLD AUTO: 1.67 K/UL — SIGNIFICANT CHANGE UP (ref 1–3.3)
LYMPHOCYTES # BLD AUTO: 18.7 % — SIGNIFICANT CHANGE UP (ref 13–44)
MCHC RBC-ENTMCNC: 27.1 PG — SIGNIFICANT CHANGE UP (ref 27–34)
MCHC RBC-ENTMCNC: 29.8 GM/DL — LOW (ref 32–36)
MCV RBC AUTO: 91 FL — SIGNIFICANT CHANGE UP (ref 80–100)
MONOCYTES # BLD AUTO: 0.71 K/UL — SIGNIFICANT CHANGE UP (ref 0–0.9)
MONOCYTES NFR BLD AUTO: 7.9 % — SIGNIFICANT CHANGE UP (ref 2–14)
NEUTROPHILS # BLD AUTO: 6.25 K/UL — SIGNIFICANT CHANGE UP (ref 1.8–7.4)
NEUTROPHILS NFR BLD AUTO: 70 % — SIGNIFICANT CHANGE UP (ref 43–77)
NITRITE UR-MCNC: NEGATIVE — SIGNIFICANT CHANGE UP
NRBC # BLD: 0 /100 WBCS — SIGNIFICANT CHANGE UP (ref 0–0)
PH UR: 6 — SIGNIFICANT CHANGE UP (ref 5–8)
PLATELET # BLD AUTO: 220 K/UL — SIGNIFICANT CHANGE UP (ref 150–400)
POTASSIUM SERPL-MCNC: 4.4 MMOL/L — SIGNIFICANT CHANGE UP (ref 3.5–5.3)
POTASSIUM SERPL-SCNC: 4.4 MMOL/L — SIGNIFICANT CHANGE UP (ref 3.5–5.3)
PROT SERPL-MCNC: 6.8 G/DL — SIGNIFICANT CHANGE UP (ref 6–8.3)
PROT UR-MCNC: ABNORMAL MG/DL
RBC # BLD: 2.99 M/UL — LOW (ref 4.2–5.8)
RBC # FLD: 15.6 % — HIGH (ref 10.3–14.5)
RBC CASTS # UR COMP ASSIST: < 5 /HPF — SIGNIFICANT CHANGE UP
SODIUM SERPL-SCNC: 140 MMOL/L — SIGNIFICANT CHANGE UP (ref 135–145)
SP GR SPEC: 1.02 — SIGNIFICANT CHANGE UP (ref 1–1.03)
UROBILINOGEN FLD QL: 0.2 E.U./DL — SIGNIFICANT CHANGE UP
WBC # BLD: 8.94 K/UL — SIGNIFICANT CHANGE UP (ref 3.8–10.5)
WBC # FLD AUTO: 8.94 K/UL — SIGNIFICANT CHANGE UP (ref 3.8–10.5)
WBC UR QL: ABNORMAL /HPF

## 2019-12-12 PROCEDURE — 99283 EMERGENCY DEPT VISIT LOW MDM: CPT | Mod: 25

## 2019-12-12 PROCEDURE — 85025 COMPLETE CBC W/AUTO DIFF WBC: CPT

## 2019-12-12 PROCEDURE — 81001 URINALYSIS AUTO W/SCOPE: CPT

## 2019-12-12 PROCEDURE — 51702 INSERT TEMP BLADDER CATH: CPT

## 2019-12-12 PROCEDURE — 36415 COLL VENOUS BLD VENIPUNCTURE: CPT

## 2019-12-12 PROCEDURE — 80053 COMPREHEN METABOLIC PANEL: CPT

## 2019-12-12 PROCEDURE — 99284 EMERGENCY DEPT VISIT MOD MDM: CPT

## 2019-12-12 PROCEDURE — 87086 URINE CULTURE/COLONY COUNT: CPT

## 2019-12-12 PROCEDURE — 87186 SC STD MICRODIL/AGAR DIL: CPT

## 2019-12-12 RX ORDER — CEPHALEXIN 500 MG
1 CAPSULE ORAL
Qty: 10 | Refills: 0
Start: 2019-12-12 | End: 2019-12-16

## 2019-12-12 RX ORDER — CEPHALEXIN 500 MG
500 CAPSULE ORAL ONCE
Refills: 0 | Status: COMPLETED | OUTPATIENT
Start: 2019-12-12 | End: 2019-12-12

## 2019-12-12 RX ORDER — SODIUM CHLORIDE 9 MG/ML
500 INJECTION INTRAMUSCULAR; INTRAVENOUS; SUBCUTANEOUS ONCE
Refills: 0 | Status: COMPLETED | OUTPATIENT
Start: 2019-12-12 | End: 2019-12-12

## 2019-12-12 RX ADMIN — Medication 500 MILLIGRAM(S): at 21:04

## 2019-12-12 RX ADMIN — SODIUM CHLORIDE 500 MILLILITER(S): 9 INJECTION INTRAMUSCULAR; INTRAVENOUS; SUBCUTANEOUS at 21:14

## 2019-12-12 NOTE — ED PROVIDER NOTE - OBJECTIVE STATEMENT
here with difficulty urinating for past few days.  Says history of similar requiring stroud a few weeks ago.  Went home with leg bag, followed up with Dr. Perry and catheter removed.  Says no changes to medicines made.  Denies fever/chills.  Some intermittent suprapubic discomfort.  Also with dysuria.  Symptoms intermittent.  Tolerating po, no n/v.

## 2019-12-12 NOTE — ED ADULT NURSE NOTE - NSIMPLEMENTINTERV_GEN_ALL_ED
Implemented All Fall Risk Interventions:  [place] to call system. Call bell, personal items and telephone within reach. Instruct patient to call for assistance. Room bathroom lighting operational. Non-slip footwear when patient is off stretcher. Physically safe environment: no spills, clutter or unnecessary equipment. Stretcher in lowest position, wheels locked, appropriate side rails in place. Provide visual cue, wrist band, yellow gown, etc. Monitor gait and stability. Monitor for mental status changes and reorient to person, place, and time. Review medications for side effects contributing to fall risk. Reinforce activity limits and safety measures with patient and family.

## 2019-12-12 NOTE — ED PROVIDER NOTE - NSFOLLOWUPINSTRUCTIONS_ED_ALL_ED_FT
Please see your urologist next week for reassessment.  Call for appointment.  If you have any problems with followup, please call the ED Referral Coordinator at [phone].  Return to the ER if symptoms worsen or other concerns.    Indwelling Urinary Catheter Insertion, Care After  This sheet gives you information about how to care for yourself after your procedure. Your health care provider may also give you more specific instructions. If you have problems or questions, contact your health care provider.    What can I expect after the procedure?  After the procedure, it is common to have:  Slight discomfort around your urethra where the catheter enters your body.  Follow these instructions at home:  Image   Keep the drainage bag at or below the level of your bladder. Doing this ensures that urine can only drain out, not back into your body.  Secure the catheter tubing and drainage bag to your leg or thigh to keep it from moving.  Check the catheter tubing regularly to make sure there are no kinks or blockages.  Take showers daily to keep the catheter clean. Do not take a bath.  Do not pull on your catheter or try to remove it.  Disconnect the tubing and drainage bag as little as possible.  Empty the drainage bag every 2–4 hours, or more often if needed. Do not let the bag get completely full.  Wash your hands with soap and water before and after touching the catheter, tubing, or drainage bag.  Do not let the drainage bag or catheter tubing touch the floor.  Drink enough fluids to keep your urine clear or pale yellow, or as told by your health care provider.  Contact a health care provider if:  Urine stops flowing into the drainage bag.  You feel pain or pressure in the bladder area.  You have back pain.  Your catheter gets clogged.  Your catheter starts to leak.  Your urine looks cloudy.  Your drainage bag or tubing looks dirty.  You notice a bad smell when emptying your drainage bag.  Get help right away if:  You have a fever or chills.  You have severe pain in your back or your lower abdomen.  You have warmth, redness, swelling, or pain in the urethra area.  You notice blood in your urine.  Your catheter gets pulled out.  Summary  Do not pull on your catheter or try to remove it.  Keep the drainage bag at or below the level of your bladder, but do not let the drainage bag or catheter tubing touch the floor.  Wash your hands with soap and water before and after touching the catheter, tubing, or drainage bag.  Contact your health care provider if you have a fever, chills, or any other signs of infection.  This information is not intended to replace advice given to you by your health care provider. Make sure you discuss any questions you have with your health care provider.    Urinary Tract Infection    A urinary tract infection (UTI) is an infection of any part of the urinary tract, which includes the kidneys, ureters, bladder, and urethra. Risk factors include ignoring your need to urinate, wiping back to front if female, being an uncircumcised male, and having diabetes or a weak immune system. Symptoms include frequent urination, pain or burning with urination, foul smelling urine, cloudy urine, pain in the lower abdomen, blood in the urine, and fever. If you were prescribed an antibiotic medicine, take it as told by your health care provider. Do not stop taking the antibiotic even if you start to feel better.    SEEK IMMEDIATE MEDICAL CARE IF YOU HAVE ANY OF THE FOLLOWING SYMPTOMS: severe back or abdominal pain, fever, inability to keep fluids or medicine down, dizziness/lightheadedness, or a change in mental status.

## 2019-12-12 NOTE — ED PROVIDER NOTE - CLINICAL SUMMARY MEDICAL DECISION MAKING FREE TEXT BOX
recurrent uti.  suprapubic discomfort/ dysuria.  no systemic symptoms.  suprapubic fullness on exam, otherwise benign.  stroud placed, 600ml urine output, felt better.  labs with mild lucas> given ivf.  uti > given keflex.  plan home with stroud, urology f/u.  return precautions discussed

## 2019-12-12 NOTE — ED PROVIDER NOTE - PATIENT PORTAL LINK FT
You can access the FollowMyHealth Patient Portal offered by [facility] by registering at the following website: [email or web address]. By joining [employer]’s FollowMyHealth portal, you will also be able to view your health information using other applications (apps) compatible with our system.

## 2019-12-12 NOTE — ED PROVIDER NOTE - CONSTITUTIONAL, MLM
normal... frail appearing, awake, alert, oriented to person, place, time/situation and in no apparent distress.

## 2019-12-14 LAB
-  AMPICILLIN/SULBACTAM: SIGNIFICANT CHANGE UP
-  AMPICILLIN: SIGNIFICANT CHANGE UP
-  CEFAZOLIN: SIGNIFICANT CHANGE UP
-  CEFTRIAXONE: SIGNIFICANT CHANGE UP
-  GENTAMICIN: SIGNIFICANT CHANGE UP
-  NITROFURANTOIN: SIGNIFICANT CHANGE UP
-  PIPERACILLIN/TAZOBACTAM: SIGNIFICANT CHANGE UP
-  TOBRAMYCIN: SIGNIFICANT CHANGE UP
-  TRIMETHOPRIM/SULFAMETHOXAZOLE: SIGNIFICANT CHANGE UP
CULTURE RESULTS: SIGNIFICANT CHANGE UP
METHOD TYPE: SIGNIFICANT CHANGE UP
ORGANISM # SPEC MICROSCOPIC CNT: SIGNIFICANT CHANGE UP
ORGANISM # SPEC MICROSCOPIC CNT: SIGNIFICANT CHANGE UP
SPECIMEN SOURCE: SIGNIFICANT CHANGE UP

## 2019-12-16 PROBLEM — I10 ESSENTIAL (PRIMARY) HYPERTENSION: Chronic | Status: ACTIVE | Noted: 2019-12-12

## 2019-12-17 ENCOUNTER — APPOINTMENT (OUTPATIENT)
Dept: BARIATRICS | Facility: CLINIC | Age: 84
End: 2019-12-17

## 2019-12-17 VITALS
BODY MASS INDEX: 23.89 KG/M2 | SYSTOLIC BLOOD PRESSURE: 136 MMHG | HEIGHT: 65 IN | TEMPERATURE: 97.3 F | DIASTOLIC BLOOD PRESSURE: 93 MMHG | OXYGEN SATURATION: 100 % | HEART RATE: 81 BPM | WEIGHT: 143.38 LBS

## 2019-12-18 DIAGNOSIS — R33.9 RETENTION OF URINE, UNSPECIFIED: ICD-10-CM

## 2019-12-18 DIAGNOSIS — N39.0 URINARY TRACT INFECTION, SITE NOT SPECIFIED: ICD-10-CM

## 2019-12-19 ENCOUNTER — APPOINTMENT (OUTPATIENT)
Dept: UROLOGY | Facility: CLINIC | Age: 84
End: 2019-12-19
Payer: MEDICARE

## 2019-12-19 VITALS — HEART RATE: 91 BPM | SYSTOLIC BLOOD PRESSURE: 149 MMHG | DIASTOLIC BLOOD PRESSURE: 58 MMHG | TEMPERATURE: 97.4 F

## 2019-12-19 PROCEDURE — 99213 OFFICE O/P EST LOW 20 MIN: CPT

## 2019-12-23 ENCOUNTER — APPOINTMENT (OUTPATIENT)
Dept: UROLOGY | Facility: CLINIC | Age: 84
End: 2019-12-23
Payer: MEDICARE

## 2019-12-23 VITALS — TEMPERATURE: 98.2 F | SYSTOLIC BLOOD PRESSURE: 165 MMHG | DIASTOLIC BLOOD PRESSURE: 59 MMHG | HEART RATE: 106 BPM

## 2019-12-23 PROCEDURE — 99213 OFFICE O/P EST LOW 20 MIN: CPT

## 2019-12-23 NOTE — HISTORY OF PRESENT ILLNESS
[None] : None [FreeTextEntry1] : Pt here today for a trial of void.\par \par 89 yo M with urinary retention. \par Instilled 120 cc of Sterile water.\par Pt started experiencing bladder spasm.\par Catheter removed, intact. \par \par Pt will remain in the waiting area. \par Suggested to drink plenty of water, and walk around.\par \par

## 2019-12-23 NOTE — ASSESSMENT
[FreeTextEntry1] : Urinary Retention\par \par Drank 3 cups of water\par Voided: 150 cc\par PVR: 50 cc\par Good FOS\par Continue Flomax 0.8 mg\par Will call office or go to ER if unable to urinate, fever, hematuria or any concerning symptoms\par F/u 1week\par

## 2019-12-23 NOTE — PHYSICAL EXAM
[General Appearance - Well Developed] : well developed [General Appearance - Well Nourished] : well nourished [Normal Appearance] : normal appearance [General Appearance - In No Acute Distress] : no acute distress [Well Groomed] : well groomed [Abdomen Tenderness] : non-tender [Abdomen Soft] : soft [Costovertebral Angle Tenderness] : no ~M costovertebral angle tenderness [] : no respiratory distress [Edema] : no peripheral edema [Respiration, Rhythm And Depth] : normal respiratory rhythm and effort [Exaggerated Use Of Accessory Muscles For Inspiration] : no accessory muscle use [Oriented To Time, Place, And Person] : oriented to person, place, and time [Mood] : the mood was normal [Affect] : the affect was normal [Not Anxious] : not anxious

## 2020-01-03 ENCOUNTER — APPOINTMENT (OUTPATIENT)
Dept: UROLOGY | Facility: CLINIC | Age: 85
End: 2020-01-03
Payer: MEDICARE

## 2020-01-03 VITALS — DIASTOLIC BLOOD PRESSURE: 68 MMHG | SYSTOLIC BLOOD PRESSURE: 138 MMHG | TEMPERATURE: 98.4 F | HEART RATE: 80 BPM

## 2020-01-03 PROCEDURE — 99213 OFFICE O/P EST LOW 20 MIN: CPT

## 2020-01-03 RX ORDER — BETAMETHASONE DIPROPIONATE 0.5 MG/G
0.05 CREAM TOPICAL TWICE DAILY
Qty: 1 | Refills: 1 | Status: ACTIVE | COMMUNITY
Start: 2019-07-24 | End: 1900-01-01

## 2020-01-03 NOTE — HISTORY OF PRESENT ILLNESS
[FreeTextEntry1] : 89 yo M with history of urinary retention\par Denies any urinary complaints\par No hematuria, no dysuria, no difficulty urinating, no bladder spasms, no fever, no n/v\par Flomax working well\par States he takes 0.8 mg in am and night \par Denies dizziness\par \par

## 2020-01-03 NOTE — ASSESSMENT
[FreeTextEntry1] : Urinary Retention \par -PVR= 43 mL\par - Educated on Flomax daily dosage. He will take 0.4 mg in the am and 0.4 mg in the evening. He verbalized understanding. Written instructions also provided. \par \par F/u 6 months

## 2020-01-03 NOTE — PHYSICAL EXAM
[General Appearance - Well Developed] : well developed [General Appearance - Well Nourished] : well nourished [Normal Appearance] : normal appearance [General Appearance - In No Acute Distress] : no acute distress [Well Groomed] : well groomed [Abdomen Tenderness] : non-tender [Abdomen Soft] : soft [Costovertebral Angle Tenderness] : no ~M costovertebral angle tenderness [] : no respiratory distress [Respiration, Rhythm And Depth] : normal respiratory rhythm and effort [Exaggerated Use Of Accessory Muscles For Inspiration] : no accessory muscle use [Oriented To Time, Place, And Person] : oriented to person, place, and time [Not Anxious] : not anxious [Mood] : the mood was normal [Affect] : the affect was normal [No Focal Deficits] : no focal deficits [FreeTextEntry1] : uses walker

## 2020-01-06 ENCOUNTER — MEDICATION RENEWAL (OUTPATIENT)
Age: 85
End: 2020-01-06

## 2020-01-06 ENCOUNTER — RESULT REVIEW (OUTPATIENT)
Age: 85
End: 2020-01-06

## 2020-01-06 LAB
APPEARANCE: ABNORMAL
BACTERIA UR CULT: NORMAL
BACTERIA: ABNORMAL
BILIRUBIN URINE: NEGATIVE
BLOOD URINE: NORMAL
COLOR: YELLOW
GLUCOSE QUALITATIVE U: NEGATIVE
HYALINE CASTS: 0 /LPF
KETONES URINE: NEGATIVE
LEUKOCYTE ESTERASE URINE: ABNORMAL
MICROSCOPIC-UA: NORMAL
NITRITE URINE: POSITIVE
PH URINE: 6.5
PROTEIN URINE: NORMAL
RED BLOOD CELLS URINE: 3 /HPF
SPECIFIC GRAVITY URINE: 1.02
SQUAMOUS EPITHELIAL CELLS: 1 /HPF
UROBILINOGEN URINE: NORMAL
WHITE BLOOD CELLS URINE: 169 /HPF

## 2020-01-14 ENCOUNTER — APPOINTMENT (OUTPATIENT)
Dept: BARIATRICS | Facility: CLINIC | Age: 85
End: 2020-01-14
Payer: MEDICARE

## 2020-01-14 VITALS
DIASTOLIC BLOOD PRESSURE: 67 MMHG | WEIGHT: 144.03 LBS | HEIGHT: 65 IN | BODY MASS INDEX: 24 KG/M2 | TEMPERATURE: 98.4 F | OXYGEN SATURATION: 98 % | SYSTOLIC BLOOD PRESSURE: 147 MMHG | HEART RATE: 78 BPM

## 2020-01-14 PROCEDURE — 99214 OFFICE O/P EST MOD 30 MIN: CPT

## 2020-01-17 LAB
APPEARANCE: ABNORMAL
BACTERIA UR CULT: NORMAL
BACTERIA: ABNORMAL
BILIRUBIN URINE: NEGATIVE
BLOOD URINE: NORMAL
COLOR: YELLOW
GLUCOSE QUALITATIVE U: NEGATIVE
HYALINE CASTS: 0 /LPF
KETONES URINE: NEGATIVE
LEUKOCYTE ESTERASE URINE: ABNORMAL
MICROSCOPIC-UA: NORMAL
NITRITE URINE: POSITIVE
PH URINE: 6
PROTEIN URINE: ABNORMAL
RED BLOOD CELLS URINE: 9 /HPF
SPECIFIC GRAVITY URINE: 1.02
SQUAMOUS EPITHELIAL CELLS: 2 /HPF
UROBILINOGEN URINE: NORMAL
WHITE BLOOD CELLS URINE: 126 /HPF

## 2020-05-05 NOTE — PHYSICAL THERAPY INITIAL EVALUATION ADULT - ACTIVE RANGE OF MOTION EXAMINATION, REHAB EVAL
azaTHIOprine (IMURAN) 50 MG tablet      Last Written Prescription Date:  9/23/2019  Last Fill Quantity: 105,   # refills: 5  Last Office Visit: 4/29/2020  Future Office visit:  8/3/2020    CBC RESULTS:   Recent Labs   Lab Test 06/06/19  0853   WBC 4.9   RBC 4.62   HGB 13.8   HCT 41.9   MCV 91   MCH 29.9   MCHC 32.9   RDW 13.9          Creatinine   Date Value Ref Range Status   06/06/2019 1.03 0.66 - 1.25 mg/dL Final   ]    Liver Function Studies -   Recent Labs   Lab Test 06/06/19  0853   PROTTOTAL 7.1   ALBUMIN 3.5   BILITOTAL 0.7   ALKPHOS 101   AST 21   ALT 27       Routing refill request to provider for review/approval because:  DMARD medication.  - labs               no Active ROM deficits were identified

## 2020-05-12 LAB — BACTERIA UR CULT: ABNORMAL

## 2020-05-18 ENCOUNTER — INPATIENT (INPATIENT)
Facility: HOSPITAL | Age: 85
LOS: 2 days | Discharge: HOME HEALTH | DRG: 682 | End: 2020-05-21
Attending: INTERNAL MEDICINE | Admitting: INTERNAL MEDICINE
Payer: MEDICARE

## 2020-05-18 VITALS
OXYGEN SATURATION: 97 % | DIASTOLIC BLOOD PRESSURE: 63 MMHG | HEART RATE: 73 BPM | RESPIRATION RATE: 17 BRPM | SYSTOLIC BLOOD PRESSURE: 111 MMHG

## 2020-05-18 DIAGNOSIS — E87.2 ACIDOSIS: ICD-10-CM

## 2020-05-18 DIAGNOSIS — N40.0 BENIGN PROSTATIC HYPERPLASIA WITHOUT LOWER URINARY TRACT SYMPTOMS: ICD-10-CM

## 2020-05-18 DIAGNOSIS — E87.1 HYPO-OSMOLALITY AND HYPONATREMIA: ICD-10-CM

## 2020-05-18 DIAGNOSIS — N17.9 ACUTE KIDNEY FAILURE, UNSPECIFIED: ICD-10-CM

## 2020-05-18 DIAGNOSIS — D64.9 ANEMIA, UNSPECIFIED: ICD-10-CM

## 2020-05-18 DIAGNOSIS — Z98.890 OTHER SPECIFIED POSTPROCEDURAL STATES: Chronic | ICD-10-CM

## 2020-05-18 DIAGNOSIS — I10 ESSENTIAL (PRIMARY) HYPERTENSION: ICD-10-CM

## 2020-05-18 DIAGNOSIS — E87.5 HYPERKALEMIA: ICD-10-CM

## 2020-05-18 DIAGNOSIS — N30.00 ACUTE CYSTITIS WITHOUT HEMATURIA: ICD-10-CM

## 2020-05-18 DIAGNOSIS — I44.0 ATRIOVENTRICULAR BLOCK, FIRST DEGREE: ICD-10-CM

## 2020-05-18 DIAGNOSIS — Z29.9 ENCOUNTER FOR PROPHYLACTIC MEASURES, UNSPECIFIED: ICD-10-CM

## 2020-05-18 LAB
ALBUMIN SERPL ELPH-MCNC: 3 G/DL — LOW (ref 3.3–5)
ALP SERPL-CCNC: 57 U/L — SIGNIFICANT CHANGE UP (ref 40–120)
ALT FLD-CCNC: 6 U/L — LOW (ref 10–45)
ANION GAP SERPL CALC-SCNC: 10 MMOL/L — SIGNIFICANT CHANGE UP (ref 5–17)
ANION GAP SERPL CALC-SCNC: 10 MMOL/L — SIGNIFICANT CHANGE UP (ref 5–17)
APPEARANCE UR: CLEAR — SIGNIFICANT CHANGE UP
APTT BLD: 30.3 SEC — SIGNIFICANT CHANGE UP (ref 27.5–36.3)
AST SERPL-CCNC: 17 U/L — SIGNIFICANT CHANGE UP (ref 10–40)
BASOPHILS # BLD AUTO: 0 K/UL — SIGNIFICANT CHANGE UP (ref 0–0.2)
BASOPHILS NFR BLD AUTO: 0 % — SIGNIFICANT CHANGE UP (ref 0–2)
BILIRUB SERPL-MCNC: <0.2 MG/DL — SIGNIFICANT CHANGE UP (ref 0.2–1.2)
BILIRUB UR-MCNC: NEGATIVE — SIGNIFICANT CHANGE UP
BLD GP AB SCN SERPL QL: NEGATIVE — SIGNIFICANT CHANGE UP
BUN SERPL-MCNC: 45 MG/DL — HIGH (ref 7–23)
BUN SERPL-MCNC: 49 MG/DL — HIGH (ref 7–23)
CALCIUM SERPL-MCNC: 8.7 MG/DL — SIGNIFICANT CHANGE UP (ref 8.4–10.5)
CALCIUM SERPL-MCNC: 8.8 MG/DL — SIGNIFICANT CHANGE UP (ref 8.4–10.5)
CHLORIDE SERPL-SCNC: 108 MMOL/L — SIGNIFICANT CHANGE UP (ref 96–108)
CHLORIDE SERPL-SCNC: 112 MMOL/L — HIGH (ref 96–108)
CK SERPL-CCNC: 37 U/L — SIGNIFICANT CHANGE UP (ref 30–200)
CO2 SERPL-SCNC: 10 MMOL/L — CRITICAL LOW (ref 22–31)
CO2 SERPL-SCNC: 9 MMOL/L — CRITICAL LOW (ref 22–31)
COLOR SPEC: YELLOW — SIGNIFICANT CHANGE UP
CREAT ?TM UR-MCNC: 112 MG/DL — SIGNIFICANT CHANGE UP
CREAT SERPL-MCNC: 2.94 MG/DL — HIGH (ref 0.5–1.3)
CREAT SERPL-MCNC: 3.25 MG/DL — HIGH (ref 0.5–1.3)
DIFF PNL FLD: ABNORMAL
EOSINOPHIL # BLD AUTO: 0.21 K/UL — SIGNIFICANT CHANGE UP (ref 0–0.5)
EOSINOPHIL NFR BLD AUTO: 3.5 % — SIGNIFICANT CHANGE UP (ref 0–6)
GAS PNL BLDV: SIGNIFICANT CHANGE UP
GLUCOSE BLDC GLUCOMTR-MCNC: 140 MG/DL — HIGH (ref 70–99)
GLUCOSE BLDC GLUCOMTR-MCNC: 86 MG/DL — SIGNIFICANT CHANGE UP (ref 70–99)
GLUCOSE SERPL-MCNC: 79 MG/DL — SIGNIFICANT CHANGE UP (ref 70–99)
GLUCOSE SERPL-MCNC: 99 MG/DL — SIGNIFICANT CHANGE UP (ref 70–99)
GLUCOSE UR QL: NEGATIVE — SIGNIFICANT CHANGE UP
HCT VFR BLD CALC: 21.9 % — LOW (ref 39–50)
HCT VFR BLD CALC: 26.7 % — LOW (ref 39–50)
HGB BLD-MCNC: 6.9 G/DL — CRITICAL LOW (ref 13–17)
HGB BLD-MCNC: 8.3 G/DL — LOW (ref 13–17)
INR BLD: 1.1 — SIGNIFICANT CHANGE UP (ref 0.88–1.16)
KETONES UR-MCNC: NEGATIVE — SIGNIFICANT CHANGE UP
LACTATE SERPL-SCNC: 2 MMOL/L — SIGNIFICANT CHANGE UP (ref 0.5–2)
LEUKOCYTE ESTERASE UR-ACNC: ABNORMAL
LYMPHOCYTES # BLD AUTO: 0.96 K/UL — LOW (ref 1–3.3)
LYMPHOCYTES # BLD AUTO: 16.1 % — SIGNIFICANT CHANGE UP (ref 13–44)
MCHC RBC-ENTMCNC: 27.8 PG — SIGNIFICANT CHANGE UP (ref 27–34)
MCHC RBC-ENTMCNC: 28.5 PG — SIGNIFICANT CHANGE UP (ref 27–34)
MCHC RBC-ENTMCNC: 31.1 GM/DL — LOW (ref 32–36)
MCHC RBC-ENTMCNC: 31.5 GM/DL — LOW (ref 32–36)
MCV RBC AUTO: 88.3 FL — SIGNIFICANT CHANGE UP (ref 80–100)
MCV RBC AUTO: 91.8 FL — SIGNIFICANT CHANGE UP (ref 80–100)
MONOCYTES # BLD AUTO: 0.22 K/UL — SIGNIFICANT CHANGE UP (ref 0–0.9)
MONOCYTES NFR BLD AUTO: 3.6 % — SIGNIFICANT CHANGE UP (ref 2–14)
NEUTROPHILS # BLD AUTO: 4.55 K/UL — SIGNIFICANT CHANGE UP (ref 1.8–7.4)
NEUTROPHILS NFR BLD AUTO: 75.9 % — SIGNIFICANT CHANGE UP (ref 43–77)
NITRITE UR-MCNC: NEGATIVE — SIGNIFICANT CHANGE UP
NRBC # BLD: 0 /100 WBCS — SIGNIFICANT CHANGE UP (ref 0–0)
OSMOLALITY SERPL: 287 MOSMOL/KG — SIGNIFICANT CHANGE UP (ref 280–301)
OSMOLALITY UR: 384 MOSMOL/KG — SIGNIFICANT CHANGE UP (ref 100–650)
PH UR: 6 — SIGNIFICANT CHANGE UP (ref 5–8)
PLATELET # BLD AUTO: 107 K/UL — LOW (ref 150–400)
PLATELET # BLD AUTO: 115 K/UL — LOW (ref 150–400)
POTASSIUM SERPL-MCNC: 5.7 MMOL/L — HIGH (ref 3.5–5.3)
POTASSIUM SERPL-MCNC: 6 MMOL/L — HIGH (ref 3.5–5.3)
POTASSIUM SERPL-SCNC: 5.7 MMOL/L — HIGH (ref 3.5–5.3)
POTASSIUM SERPL-SCNC: 6 MMOL/L — HIGH (ref 3.5–5.3)
PROT SERPL-MCNC: 6.2 G/DL — SIGNIFICANT CHANGE UP (ref 6–8.3)
PROT UR-MCNC: NEGATIVE MG/DL — SIGNIFICANT CHANGE UP
PROTHROM AB SERPL-ACNC: 12.6 SEC — SIGNIFICANT CHANGE UP (ref 10–12.9)
RBC # BLD: 2.48 M/UL — LOW (ref 4.2–5.8)
RBC # BLD: 2.91 M/UL — LOW (ref 4.2–5.8)
RBC # FLD: 15.9 % — HIGH (ref 10.3–14.5)
RBC # FLD: 17.1 % — HIGH (ref 10.3–14.5)
RETICS #: 56.5 K/UL — SIGNIFICANT CHANGE UP (ref 25–125)
RETICS/RBC NFR: 2.3 % — SIGNIFICANT CHANGE UP (ref 0.5–2.5)
RH IG SCN BLD-IMP: POSITIVE — SIGNIFICANT CHANGE UP
SARS-COV-2 RNA SPEC QL NAA+PROBE: SIGNIFICANT CHANGE UP
SODIUM SERPL-SCNC: 128 MMOL/L — LOW (ref 135–145)
SODIUM SERPL-SCNC: 131 MMOL/L — LOW (ref 135–145)
SODIUM UR-SCNC: 50 MMOL/L — SIGNIFICANT CHANGE UP
SP GR SPEC: 1.02 — SIGNIFICANT CHANGE UP (ref 1–1.03)
TRANSFERRIN SERPL-MCNC: 145 MG/DL — LOW (ref 200–360)
TSH SERPL-MCNC: 1.93 UIU/ML — SIGNIFICANT CHANGE UP (ref 0.35–4.94)
UROBILINOGEN FLD QL: 0.2 E.U./DL — SIGNIFICANT CHANGE UP
UUN UR-MCNC: 603 MG/DL — SIGNIFICANT CHANGE UP
WBC # BLD: 5.99 K/UL — SIGNIFICANT CHANGE UP (ref 3.8–10.5)
WBC # BLD: 6.83 K/UL — SIGNIFICANT CHANGE UP (ref 3.8–10.5)
WBC # FLD AUTO: 5.99 K/UL — SIGNIFICANT CHANGE UP (ref 3.8–10.5)
WBC # FLD AUTO: 6.83 K/UL — SIGNIFICANT CHANGE UP (ref 3.8–10.5)

## 2020-05-18 PROCEDURE — 99285 EMERGENCY DEPT VISIT HI MDM: CPT

## 2020-05-18 PROCEDURE — 93010 ELECTROCARDIOGRAM REPORT: CPT

## 2020-05-18 PROCEDURE — 71045 X-RAY EXAM CHEST 1 VIEW: CPT | Mod: 26

## 2020-05-18 PROCEDURE — 99223 1ST HOSP IP/OBS HIGH 75: CPT | Mod: GC

## 2020-05-18 RX ORDER — SODIUM ZIRCONIUM CYCLOSILICATE 10 G/10G
10 POWDER, FOR SUSPENSION ORAL ONCE
Refills: 0 | Status: COMPLETED | OUTPATIENT
Start: 2020-05-18 | End: 2020-05-19

## 2020-05-18 RX ORDER — CEFTRIAXONE 500 MG/1
1000 INJECTION, POWDER, FOR SOLUTION INTRAMUSCULAR; INTRAVENOUS EVERY 24 HOURS
Refills: 0 | Status: DISCONTINUED | OUTPATIENT
Start: 2020-05-18 | End: 2020-05-21

## 2020-05-18 RX ORDER — OMEPRAZOLE 10 MG/1
1 CAPSULE, DELAYED RELEASE ORAL
Qty: 0 | Refills: 0 | DISCHARGE

## 2020-05-18 RX ORDER — DEXTROSE 50 % IN WATER 50 %
50 SYRINGE (ML) INTRAVENOUS ONCE
Refills: 0 | Status: COMPLETED | OUTPATIENT
Start: 2020-05-18 | End: 2020-05-18

## 2020-05-18 RX ORDER — PANTOPRAZOLE SODIUM 20 MG/1
40 TABLET, DELAYED RELEASE ORAL
Refills: 0 | Status: DISCONTINUED | OUTPATIENT
Start: 2020-05-18 | End: 2020-05-21

## 2020-05-18 RX ORDER — ALBUTEROL 90 UG/1
8 AEROSOL, METERED ORAL ONCE
Refills: 0 | Status: COMPLETED | OUTPATIENT
Start: 2020-05-18 | End: 2020-05-18

## 2020-05-18 RX ORDER — TAMSULOSIN HYDROCHLORIDE 0.4 MG/1
0.4 CAPSULE ORAL
Refills: 0 | Status: DISCONTINUED | OUTPATIENT
Start: 2020-05-18 | End: 2020-05-20

## 2020-05-18 RX ORDER — CALCIUM GLUCONATE 100 MG/ML
1 VIAL (ML) INTRAVENOUS ONCE
Refills: 0 | Status: COMPLETED | OUTPATIENT
Start: 2020-05-18 | End: 2020-05-18

## 2020-05-18 RX ORDER — SODIUM CHLORIDE 9 MG/ML
1000 INJECTION INTRAMUSCULAR; INTRAVENOUS; SUBCUTANEOUS ONCE
Refills: 0 | Status: COMPLETED | OUTPATIENT
Start: 2020-05-18 | End: 2020-05-18

## 2020-05-18 RX ORDER — SODIUM ZIRCONIUM CYCLOSILICATE 10 G/10G
10 POWDER, FOR SUSPENSION ORAL ONCE
Refills: 0 | Status: COMPLETED | OUTPATIENT
Start: 2020-05-18 | End: 2020-05-18

## 2020-05-18 RX ORDER — SODIUM CHLORIDE 9 MG/ML
1000 INJECTION, SOLUTION INTRAVENOUS
Refills: 0 | Status: DISCONTINUED | OUTPATIENT
Start: 2020-05-18 | End: 2020-05-19

## 2020-05-18 RX ORDER — SODIUM BICARBONATE 1 MEQ/ML
100 SYRINGE (ML) INTRAVENOUS ONCE
Refills: 0 | Status: COMPLETED | OUTPATIENT
Start: 2020-05-18 | End: 2020-05-18

## 2020-05-18 RX ADMIN — Medication 50 MILLILITER(S): at 16:12

## 2020-05-18 RX ADMIN — SODIUM ZIRCONIUM CYCLOSILICATE 10 GRAM(S): 10 POWDER, FOR SUSPENSION ORAL at 21:12

## 2020-05-18 RX ADMIN — ALBUTEROL 8 PUFF(S): 90 AEROSOL, METERED ORAL at 16:59

## 2020-05-18 RX ADMIN — SODIUM CHLORIDE 1000 MILLILITER(S): 9 INJECTION INTRAMUSCULAR; INTRAVENOUS; SUBCUTANEOUS at 15:54

## 2020-05-18 RX ADMIN — CEFTRIAXONE 100 MILLIGRAM(S): 500 INJECTION, POWDER, FOR SOLUTION INTRAMUSCULAR; INTRAVENOUS at 21:13

## 2020-05-18 RX ADMIN — Medication 100 GRAM(S): at 19:32

## 2020-05-18 RX ADMIN — Medication 50 MILLILITER(S): at 18:00

## 2020-05-18 RX ADMIN — Medication 100 MILLIEQUIVALENT(S): at 23:14

## 2020-05-18 NOTE — H&P ADULT - PROBLEM SELECTOR PLAN 5
-likely 2/2 RASHAWN  -bicarb defecit of -likely 2/2 RASHAWN  -bicarb defecit of 406 mEq  -will start bicarb 650meq -likely from poor PO intake  -Other ddx: SIADH vs adrenal insuff, urine Na 50, urine Osm 384, measured serum osm 279 (lytes sent after 1L NS)  -TSH wnl  -f/u AM cortisol    #Hypoglycemia  -likely 2/2 poor PO intake vs UTI  -Other ddx: adrenal insuff  -f/u AM cortisol  -monitor finger stick q6hrs -likely from poor PO intake  -Other ddx: SIADH vs adrenal insuff, urine Na 50, urine Osm 384, measured serum osm 279 (lytes sent after 1L NS)  -TSH wnl  -f/u AM cortisol    #Hypoglycemia  -Resolved with D50   -likely 2/2 poor PO intake vs UTI  -Other ddx: adrenal insuff  -f/u AM cortisol  -monitor finger stick q6hrs

## 2020-05-18 NOTE — H&P ADULT - PROBLEM SELECTOR PLAN 1
-likely prerenal in the setting of UTI and poor PO intake  -FENA 1.1% intrinsic, FEUrea 35.7% intrinsic -likely prerenal vs intrinsic in the setting of UTI and poor PO intake  -FENA 1.1% intrinsic, FEUrea 35.7% intrinsic  -f/u bladder scan  -f/u renal ultrasound -likely prerenal vs intrinsic in the setting of UTI and poor PO intake.   -Other ddx multiple myeloma given anemia and RASHAWN, but there's no hypercalcemia  -FENA 1.1% intrinsic, FEUrea 35.7% intrinsic  -f/u bladder scan  -f/u renal ultrasound -positive UA in the outpatient setting  -s/p 1 day of bactrim  -will continue ceftriaxone 1g qd  -f/u urine culture -positive UA in the outpatient setting as well as here today  -s/p 1 day of bactrim  -will continue with ceftriaxone 1g qd  -f/u urine culture

## 2020-05-18 NOTE — ED ADULT NURSE NOTE - NSIMPLEMENTINTERV_GEN_ALL_ED
Implemented All Fall with Harm Risk Interventions:  [place] to call system. Call bell, personal items and telephone within reach. Instruct patient to call for assistance. Room bathroom lighting operational. Non-slip footwear when patient is off stretcher. Physically safe environment: no spills, clutter or unnecessary equipment. Stretcher in lowest position, wheels locked, appropriate side rails in place. Provide visual cue, wrist band, yellow gown, etc. Monitor gait and stability. Monitor for mental status changes and reorient to person, place, and time. Review medications for side effects contributing to fall risk. Reinforce activity limits and safety measures with patient and family. Provide visual clues: red socks.

## 2020-05-18 NOTE — H&P ADULT - PROBLEM SELECTOR PLAN 9
-EKG with NSR 75 bpm, 1st degree AV block, QTc 406. Known 1st AV block.   -monitor HR, avoid AV itzel blocking agents

## 2020-05-18 NOTE — ED PROVIDER NOTE - PHYSICAL EXAMINATION
no LE edema, normal equal distal pulses  PERRL, EOMI, no nystagmus. CN intact. Strength 5/5.   ED tech chaperone for rectal temp. +external rectal skin tags    drain R abdomen: +dark yellow drainage. insertion site c/d/i.   Slightly rigid extremities. no LE edema, normal equal distal pulses  PERRL, EOMI, no nystagmus. CN intact. Strength 5/5.   ED tech chaperone for rectal temp. +external rectal skin tags    drain R abdomen: +dark yellow drainage. insertion site c/d/i.   Slightly rigid extremities.  Alert to person/place/situation. [place].

## 2020-05-18 NOTE — H&P ADULT - NSHPPHYSICALEXAM_GEN_ALL_CORE
VITAL SIGNS:  T(C): 36.3 (05-18-20 @ 18:58), Max: 36.3 (05-18-20 @ 18:58)  T(F): 97.3 (05-18-20 @ 18:58), Max: 97.3 (05-18-20 @ 18:58)  HR: 77 (05-18-20 @ 18:58) (73 - 78)  BP: 105/52 (05-18-20 @ 18:58) (105/52 - 118/78)  BP(mean): --  RR: 16 (05-18-20 @ 18:58) (16 - 18)  SpO2: 98% (05-18-20 @ 18:58) (97% - 98%)  Wt(kg): --    PHYSICAL EXAM:    Constitutional: WDWN resting comfortably in bed; NAD  Head: NC/AT  Eyes: PERRL, EOMI, anicteric sclera  ENT: no nasal discharge; uvula midline, no oropharyngeal erythema or exudates; MMM  Neck: supple; no JVD or thyromegaly  Respiratory: CTA B/L; no W/R/R, no retractions  Cardiac: +S1/S2; RRR; no M/R/G; PMI non-displaced  Gastrointestinal: abdomen soft, NT/ND; no rebound or guarding; +BSx4  Genitourinary: normal external genitalia  Back: spine midline, no bony tenderness or step-offs; no CVAT B/L  Extremities: WWP, no clubbing or cyanosis; no peripheral edema  Musculoskeletal: NROM x4; no joint swelling, tenderness or erythema  Vascular: 2+ radial, femoral, DP/PT pulses B/L  Dermatologic: skin warm, dry and intact; no rashes, wounds, or scars  Lymphatic: no submandibular or cervical LAD  Neurologic: AAOx3; CNII-XII grossly intact; no focal deficits  Psychiatric: affect and characteristics of appearance, verbalizations, behaviors are appropriate

## 2020-05-18 NOTE — H&P ADULT - ASSESSMENT
89M with h/o HTN, GERD, CKD stage 3 admitted for RASHAWN on CKD and UTI. 89M with h/o HTN, GERD, CKD stage 3 admitted for RASHAWN on CKD stage iii and UTI.

## 2020-05-18 NOTE — H&P ADULT - PROBLEM SELECTOR PLAN 6
-takes norvasc 5 and losartan 50 at home  -currently normotensive, restart home meds PRN -takes Norvasc 5 and losartan 50 at home  -currently normotensive, restart home meds PRN -likely 2/2 RASHAWN  -bicarb defecit of 406 mEq  -will give 2 amps of bicarb and then D5W 100 meq bicarb at 80cc overnight -likely 2/2 RASHAWN  -bicarb defecit of 406 mEq  -will give 2 amps of bicarb and then D5W 150 meq bicarb at 50cc overnight

## 2020-05-18 NOTE — H&P ADULT - PROBLEM SELECTOR PLAN 4
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, urine Osm 384, measured serum osm 279  -TSH wnl  -f/u AM cortisol -likely SIADH vs adrenal insuff, urine Na 50, urine Osm 384, measured serum osm 279  -TSH wnl  -f/u AM cortisol    #Hypoglycemia  -likely 2/2 poor PO intake vs UTI  -Other ddx: adrenal insuff  -f/u AM cortisol  -monitor finger stick q6hrs -no signs of GI bleed  -s/p 1u pRBC  -no iron studies performed prior to transfusion, will need further work-up  -f/u iron studies, SPEP, UPEP, immunofixation -no signs of GI bleed, rectal exam with brown stool in ED  -s/p 1u pRBC  -no iron studies performed prior to transfusion, will need further work-up  -monitor catrachita drain for signs of bleeding  -f/u iron studies, holding off SPEP, UPEP, immunofixation Likely 2/t CKD/AOCD  -no signs of GI bleed, rectal exam with brown stool in ED  -s/p 1u pRBC  -no iron studies performed prior to transfusion, will need further work-up  -monitor catrachita drain for signs of bleeding  -f/u iron studies, holding off SPEP, UPEP, immunofixation

## 2020-05-18 NOTE — ED PROVIDER NOTE - CARE PLAN
Principal Discharge DX:	Weakness  Secondary Diagnosis:	Hypoglycemia Principal Discharge DX:	Weakness  Secondary Diagnosis:	Hypoglycemia  Secondary Diagnosis:	Anemia  Secondary Diagnosis:	RASHAWN (acute kidney injury)

## 2020-05-18 NOTE — H&P ADULT - PROBLEM SELECTOR PLAN 8
-positive UA in the outpatient setting  -s/p 1 day of bactrim  -will continue ceftriaxone 1g qd  -f/u urine culture -c/w Flomax 0.4mg bid (home med)

## 2020-05-18 NOTE — ED ADULT TRIAGE NOTE - OTHER COMPLAINTS
pt c.o generalized weakness with decreased po intake x 3 days. fs 36, given amp of d50, fs 160 in triage.

## 2020-05-18 NOTE — H&P ADULT - NSHPLABSRESULTS_GEN_ALL_CORE
LABS:                         6.9    5.99  )-----------( 107      ( 18 May 2020 15:26 )             21.9     18    128<L>  |  108  |  49<H>  ----------------------------<  99  5.7<H>   |  10<LL>  |  3.25<H>    Ca    8.8      18 May 2020 15:26    TPro  6.2  /  Alb  3.0<L>  /  TBili  <0.2  /  DBili  x   /  AST  17  /  ALT  6<L>  /  AlkPhos  57  -18    PT/INR - ( 18 May 2020 15:26 )   PT: 12.6 sec;   INR: 1.10          PTT - ( 18 May 2020 15:26 )  PTT:30.3 sec  Urinalysis Basic - ( 18 May 2020 16:27 )    Color: Yellow / Appearance: Clear / S.025 / pH: x  Gluc: x / Ketone: NEGATIVE  / Bili: Negative / Urobili: 0.2 E.U./dL   Blood: x / Protein: NEGATIVE mg/dL / Nitrite: NEGATIVE   Leuk Esterase: Small / RBC: Many /HPF / WBC 5-10 /HPF   Sq Epi: x / Non Sq Epi: 0-5 /HPF / Bacteria: Present /HPF      CARDIAC MARKERS ( 18 May 2020 15:26 )  x     / x     / 37 U/L / x     / x            Lactate, Blood: 2.0 mmol/L ( @ 15:41)      RADIOLOGY, EKG & ADDITIONAL TESTS: Reviewed.

## 2020-05-18 NOTE — H&P ADULT - NSICDXPASTMEDICALHX_GEN_ALL_CORE_FT
PAST MEDICAL HISTORY:  BPH (benign prostatic hyperplasia)     GERD (gastroesophageal reflux disease)     HTN (hypertension)

## 2020-05-18 NOTE — ED PROVIDER NOTE - PROGRESS NOTE DETAILS
[place]: Labs notable for anemia (slightly worse than baseline). Also thrombocytopenia, but grossly unchanged from baseline. Also RASHAWN. Normal bili, very unlikely TTP. Also hyperkalemia w/o EKG changes. Will give albuterol (holding on insulin given hypoglycemia). CXR wnl. Will place str[place]. Will reassess. [place]: haptoglobin wnl. <300cc on initial urine output from [place], clinically not etiology of RASHAWN.   No BRB or black stool on initial rectal exam/temp.   bicarp 10, normal AG. normal lactate. ph 7.23. Pt is well appearing. Getting IVF.   Awaiting covid, TSH, UA, will reassess. [place]: Pt not hypoglycemic in ED, given d50/juice just to ensure normoglycemia. Awaiting COVID results, admission. Pt has no new complaints. updated family. [place]: Pt not hypoglycemic in ED, given d50/juice just to ensure normoglycemia. Awaiting COVID results, admission. Pt has no new complaints. updated family.  clinically does not require higher level of care at this time.

## 2020-05-18 NOTE — ED ADULT NURSE NOTE - OBJECTIVE STATEMENT
pt BIBA c/o weakness x a few days , hypoglycemic FSG 36, D50 iv given by EMS, pt with a cholecystostomy and a drainage bag

## 2020-05-18 NOTE — H&P ADULT - PROBLEM SELECTOR PLAN 10
-DVT: SCD  -GI: pantoprazole (home med)    1) PCP Contacted on Admission: (Y/N) --> No. Name & Phone #:  2) Date of Contact with PCP:  3) PCP Contacted at Discharge: (Y/N)  4) Summary of Handoff Given to PCP:   5) Post-Discharge Appointment Date and Location:

## 2020-05-18 NOTE — H&P ADULT - PROBLEM SELECTOR PROBLEM 7
Benign prostatic hyperplasia, unspecified whether lower urinary tract symptoms present Essential hypertension

## 2020-05-18 NOTE — H&P ADULT - PROBLEM SELECTOR PROBLEM 8
Acute cystitis without hematuria Benign prostatic hyperplasia, unspecified whether lower urinary tract symptoms present

## 2020-05-18 NOTE — H&P ADULT - PROBLEM SELECTOR PLAN 2
-EKG with known 1st degree AV block  -s/p albuterol -EKG with known 1st degree AV block  -s/p albuterol, lokelma 10mg  -f/u repeat BMP -likely 2/2 worsening RASHAWN  -Other ddx: adrenal insufficiency with hyponatremia, hyperkalemia and hypoglycemia  -EKG with known 1st degree AV block  -s/p albuterol, lokelma 10mg  -f/u repeat BMP, AM cortisol -likely prerenal in the setting of UTI and poor PO intake.   -Other ddx: multiple myeloma given anemia and RASHAWN, but there's no hypercalcemia  -FENA 1.1% intrinsic, FEUrea 35.7% intrinsic (urine lytes taken after 1L NS)  -f/u renal ultrasound -likely prerenal in the setting of UTI and poor PO intake.   -Other ddx: multiple myeloma given anemia and RASHAWN, but there's no hypercalcemia  -FENA 1.1% intrinsic, FEUrea 35.7% intrinsic (urine lytes taken after 1L NS)  -f/u renal ultrasound  -S/p 1LNS and 1U pRBC --> f/u repeat BMP for fluid responsiveness  -Will remove Bettencourt placed in ED as <300cc drained at time of insertion and pt without symptoms or signs of retention, monitor UOP

## 2020-05-18 NOTE — H&P ADULT - PROBLEM SELECTOR PLAN 3
-no signs of GI bleed -no signs of GI bleed  -s/p 1u pRBC  -no iron studies performed prior to transfusion, will need further work-up -likely 2/2 worsening RASHAWN  -Other ddx: adrenal insufficiency given hyponatremia, hyperkalemia and hypoglycemia  -EKG with known 1st degree AV block  -s/p albuterol, lokelma 10mg  -f/u repeat BMP, AM cortisol

## 2020-05-18 NOTE — ED PROVIDER NOTE - CLINICAL SUMMARY MEDICAL DECISION MAKING FREE TEXT BOX
89M PMH HTN, cholecystitis s/p drain p/w weakness. Has 2-3d of generalized weakness, decreased PO intake, today barely able to get out of bed. Has chronic urinary hesitancy. ESM noted FSG 36, given amp of D50. No other systemic symptoms. Mild hypothermia, other vitals wnl. Exam as above.  ddx: Possible metabolic vs. infectious. Likely aspect of dehydration.  Labs, UA, CXR, covid, IVF, reassess.

## 2020-05-18 NOTE — ED PROVIDER NOTE - OBJECTIVE STATEMENT
Additional hx obtained from Step-Daughter Freda [phone]  89M PMH HTN, cholecystitis s/p drain p/w weakness. Has 2-3d of generalized weakness, decreased PO intake, today barely able to get out of bed. Has chronic urinary hesitancy. ESM noted FSG 36, given amp of D50.  No known sick contacts, HA, SOB/CP, NVD, abd pain, focal weakness/numbness, falls.  Freda empties the drain daily, has not noticed any obvious change in amount of drainage. Possibly slightly darker. There has been no medical follow up on the drain.  meds: amlodipine 5mg qd, losartan 50mg qd, omeprazole 20mg qd, tamsulosin 0.4mg BID,   Started abx 1w ago (today was day 7/7) for UTI - likely bactrim (did not take todays dose). Prescribed By Dr. Peck.  PMD Dr. Fournier  At baseline able to ambulate w/ walker.

## 2020-05-18 NOTE — H&P ADULT - HISTORY OF PRESENT ILLNESS
89M with h/o HTN, GERD, CKD stage 3 presenting with generalized weakness, decreased PO intake and lethargy for past 3-4 days. Patient lives alone, his son and step-daughter checks upon him daily. He was noted to be lethargic, mostly staying in his bed with decreased PO intake. He reports having urinary urgency during this time. He went to see his urologist last week. He received a call from his urologist on Monday stating he has UTI. He took bactrim for one day. Subsequently he was sent to the ED. Denies melena or hematochezia. Off note, patient was admitted last year for acute cholecystitis and E coli bacteremia. He had perc catrachita with IR, treated with ceftriaxone and flagyl.     In the ED, vitals T96.3, P73, 105/52, R18, 98%RA. He received 1u pRBC, 1L NS, 2 amps dextrose and albuterol. He was admitted to [facility] for further care.     ROS: Urinary urgency resolved. No fever, chills, SOB, chest pain, palpitations, abdominal pain, n/v or diarrhea. 89M with h/o HTN, GERD, CKD stage 3 presenting with generalized weakness, decreased PO intake and lethargy for past 3-4 days. Patient lives alone, his son and step-daughter checks upon him daily. He was noted to be lethargic, mostly staying in his bed with decreased PO intake. He reports having urinary urgency during this time. He went to see his urologist last week. He received a call from his urologist on Monday stating he has UTI. He took bactrim for one day. Subsequently he was sent to the ED. Denies melena or hematochezia. Off note, patient was admitted last year for acute cholecystitis and E coli bacteremia. He had perc catrachita with IR, treated with ceftriaxone and flagyl.     EMS reports fingerstick of 36, gave an amp of dextrose. In the ED, vitals T96.3, P73, 105/52, R18, 98%RA. He received 1u pRBC, 1L NS, 2 amps dextrose and albuterol. He was admitted to [facility] for further care.     ROS: Urinary urgency resolved. No fever, chills, SOB, chest pain, palpitations, abdominal pain, n/v or diarrhea. 89M with h/o HTN, GERD, CKD stage 3, BPH presenting with generalized weakness, decreased PO intake and lethargy for past 3-4 days. Patient lives alone, his son and step-daughter checks upon him daily. He was noted to be lethargic, mostly staying in his bed with decreased PO intake. He reports having urinary urgency during this time. He went to see his urologist last week. He received a call from his urologist on Monday stating he has UTI. He took bactrim for one day. Subsequently he was sent to the ED. Denies melena or hematochezia. Off note, patient was admitted last year for acute cholecystitis and E coli bacteremia. He had perc catrachita with IR, treated with ceftriaxone and flagyl.     EMS reports fingerstick of 36, gave an amp of dextrose. In the ED, vitals T96.3, P73, 105/52, R18, 98%RA. He received 1u pRBC, 1L NS, 2 amps dextrose and albuterol. Bettencourt catheter placed, drained about 200-300cc. He was admitted to [facility] for further care.     ROS: Urinary urgency resolved. No fever, chills, SOB, chest pain, palpitations, abdominal pain, n/v or diarrhea.

## 2020-05-18 NOTE — H&P ADULT - PROBLEM SELECTOR PLAN 7
-c/w flomax 0.4mg bid (home med) -c/w Flomax 0.4mg bid (home med) -takes Norvasc 5 and losartan 50 at home  -currently normotensive, restart home meds PRN

## 2020-05-19 ENCOUNTER — TRANSCRIPTION ENCOUNTER (OUTPATIENT)
Age: 85
End: 2020-05-19

## 2020-05-19 DIAGNOSIS — N30.01 ACUTE CYSTITIS WITH HEMATURIA: ICD-10-CM

## 2020-05-19 LAB
ANION GAP SERPL CALC-SCNC: 10 MMOL/L — SIGNIFICANT CHANGE UP (ref 5–17)
ANION GAP SERPL CALC-SCNC: 9 MMOL/L — SIGNIFICANT CHANGE UP (ref 5–17)
ANION GAP SERPL CALC-SCNC: 9 MMOL/L — SIGNIFICANT CHANGE UP (ref 5–17)
BASOPHILS # BLD AUTO: 0.03 K/UL — SIGNIFICANT CHANGE UP (ref 0–0.2)
BASOPHILS NFR BLD AUTO: 0.4 % — SIGNIFICANT CHANGE UP (ref 0–2)
BUN SERPL-MCNC: 33 MG/DL — HIGH (ref 7–23)
BUN SERPL-MCNC: 37 MG/DL — HIGH (ref 7–23)
BUN SERPL-MCNC: 40 MG/DL — HIGH (ref 7–23)
CALCIUM SERPL-MCNC: 8.6 MG/DL — SIGNIFICANT CHANGE UP (ref 8.4–10.5)
CALCIUM SERPL-MCNC: 8.7 MG/DL — SIGNIFICANT CHANGE UP (ref 8.4–10.5)
CALCIUM SERPL-MCNC: 8.8 MG/DL — SIGNIFICANT CHANGE UP (ref 8.4–10.5)
CHLORIDE SERPL-SCNC: 108 MMOL/L — SIGNIFICANT CHANGE UP (ref 96–108)
CHLORIDE SERPL-SCNC: 110 MMOL/L — HIGH (ref 96–108)
CHLORIDE SERPL-SCNC: 110 MMOL/L — HIGH (ref 96–108)
CO2 SERPL-SCNC: 16 MMOL/L — LOW (ref 22–31)
CORTIS AM PEAK SERPL-MCNC: 5.7 UG/DL — SIGNIFICANT CHANGE UP (ref 3.9–37.5)
CREAT SERPL-MCNC: 2.33 MG/DL — HIGH (ref 0.5–1.3)
CREAT SERPL-MCNC: 2.52 MG/DL — HIGH (ref 0.5–1.3)
CREAT SERPL-MCNC: 2.72 MG/DL — HIGH (ref 0.5–1.3)
CULTURE RESULTS: NO GROWTH — SIGNIFICANT CHANGE UP
EOSINOPHIL # BLD AUTO: 0.47 K/UL — SIGNIFICANT CHANGE UP (ref 0–0.5)
EOSINOPHIL NFR BLD AUTO: 6.9 % — HIGH (ref 0–6)
FERRITIN SERPL-MCNC: 247 NG/ML — SIGNIFICANT CHANGE UP (ref 30–400)
GLUCOSE BLDC GLUCOMTR-MCNC: 116 MG/DL — HIGH (ref 70–99)
GLUCOSE BLDC GLUCOMTR-MCNC: 83 MG/DL — SIGNIFICANT CHANGE UP (ref 70–99)
GLUCOSE BLDC GLUCOMTR-MCNC: 89 MG/DL — SIGNIFICANT CHANGE UP (ref 70–99)
GLUCOSE BLDC GLUCOMTR-MCNC: 92 MG/DL — SIGNIFICANT CHANGE UP (ref 70–99)
GLUCOSE SERPL-MCNC: 112 MG/DL — HIGH (ref 70–99)
GLUCOSE SERPL-MCNC: 88 MG/DL — SIGNIFICANT CHANGE UP (ref 70–99)
GLUCOSE SERPL-MCNC: 93 MG/DL — SIGNIFICANT CHANGE UP (ref 70–99)
HCT VFR BLD CALC: 25.9 % — LOW (ref 39–50)
HGB BLD-MCNC: 8.6 G/DL — LOW (ref 13–17)
IMM GRANULOCYTES NFR BLD AUTO: 0.9 % — SIGNIFICANT CHANGE UP (ref 0–1.5)
IRON SATN MFR SERPL: 149 UG/DL — SIGNIFICANT CHANGE UP (ref 45–165)
IRON SATN MFR SERPL: 91 % — HIGH (ref 16–55)
LYMPHOCYTES # BLD AUTO: 1.44 K/UL — SIGNIFICANT CHANGE UP (ref 1–3.3)
LYMPHOCYTES # BLD AUTO: 21.3 % — SIGNIFICANT CHANGE UP (ref 13–44)
MAGNESIUM SERPL-MCNC: 2 MG/DL — SIGNIFICANT CHANGE UP (ref 1.6–2.6)
MCHC RBC-ENTMCNC: 28.5 PG — SIGNIFICANT CHANGE UP (ref 27–34)
MCHC RBC-ENTMCNC: 33.2 GM/DL — SIGNIFICANT CHANGE UP (ref 32–36)
MCV RBC AUTO: 85.8 FL — SIGNIFICANT CHANGE UP (ref 80–100)
MONOCYTES # BLD AUTO: 0.64 K/UL — SIGNIFICANT CHANGE UP (ref 0–0.9)
MONOCYTES NFR BLD AUTO: 9.5 % — SIGNIFICANT CHANGE UP (ref 2–14)
NEUTROPHILS # BLD AUTO: 4.13 K/UL — SIGNIFICANT CHANGE UP (ref 1.8–7.4)
NEUTROPHILS NFR BLD AUTO: 61 % — SIGNIFICANT CHANGE UP (ref 43–77)
NRBC # BLD: 0 /100 WBCS — SIGNIFICANT CHANGE UP (ref 0–0)
PHOSPHATE SERPL-MCNC: 3.1 MG/DL — SIGNIFICANT CHANGE UP (ref 2.5–4.5)
PLATELET # BLD AUTO: 109 K/UL — LOW (ref 150–400)
POTASSIUM SERPL-MCNC: 4.8 MMOL/L — SIGNIFICANT CHANGE UP (ref 3.5–5.3)
POTASSIUM SERPL-MCNC: 5.1 MMOL/L — SIGNIFICANT CHANGE UP (ref 3.5–5.3)
POTASSIUM SERPL-MCNC: 5.2 MMOL/L — SIGNIFICANT CHANGE UP (ref 3.5–5.3)
POTASSIUM SERPL-SCNC: 4.8 MMOL/L — SIGNIFICANT CHANGE UP (ref 3.5–5.3)
POTASSIUM SERPL-SCNC: 5.1 MMOL/L — SIGNIFICANT CHANGE UP (ref 3.5–5.3)
POTASSIUM SERPL-SCNC: 5.2 MMOL/L — SIGNIFICANT CHANGE UP (ref 3.5–5.3)
RBC # BLD: 3.02 M/UL — LOW (ref 4.2–5.8)
RBC # FLD: 16 % — HIGH (ref 10.3–14.5)
SARS-COV-2 RNA SPEC QL NAA+PROBE: SIGNIFICANT CHANGE UP
SODIUM SERPL-SCNC: 133 MMOL/L — LOW (ref 135–145)
SODIUM SERPL-SCNC: 135 MMOL/L — SIGNIFICANT CHANGE UP (ref 135–145)
SODIUM SERPL-SCNC: 136 MMOL/L — SIGNIFICANT CHANGE UP (ref 135–145)
SPECIMEN SOURCE: SIGNIFICANT CHANGE UP
TIBC SERPL-MCNC: 163 UG/DL — LOW (ref 220–430)
TRANSFERRIN SERPL-MCNC: 139 MG/DL — LOW (ref 200–360)
UIBC SERPL-MCNC: 69 UG/DL — LOW (ref 110–370)
WBC # BLD: 6.77 K/UL — SIGNIFICANT CHANGE UP (ref 3.8–10.5)
WBC # FLD AUTO: 6.77 K/UL — SIGNIFICANT CHANGE UP (ref 3.8–10.5)

## 2020-05-19 PROCEDURE — 99233 SBSQ HOSP IP/OBS HIGH 50: CPT | Mod: GC

## 2020-05-19 PROCEDURE — 76775 US EXAM ABDO BACK WALL LIM: CPT | Mod: 26,76

## 2020-05-19 RX ORDER — SODIUM BICARBONATE 1 MEQ/ML
325 SYRINGE (ML) INTRAVENOUS EVERY 12 HOURS
Refills: 0 | Status: DISCONTINUED | OUTPATIENT
Start: 2020-05-19 | End: 2020-05-19

## 2020-05-19 RX ORDER — ACETAMINOPHEN 500 MG
650 TABLET ORAL ONCE
Refills: 0 | Status: COMPLETED | OUTPATIENT
Start: 2020-05-19 | End: 2020-05-19

## 2020-05-19 RX ORDER — LOSARTAN POTASSIUM 100 MG/1
1 TABLET, FILM COATED ORAL
Qty: 0 | Refills: 0 | DISCHARGE

## 2020-05-19 RX ORDER — SODIUM BICARBONATE 1 MEQ/ML
650 SYRINGE (ML) INTRAVENOUS EVERY 12 HOURS
Refills: 0 | Status: DISCONTINUED | OUTPATIENT
Start: 2020-05-19 | End: 2020-05-20

## 2020-05-19 RX ORDER — AMLODIPINE BESYLATE 2.5 MG/1
1 TABLET ORAL
Qty: 0 | Refills: 0 | DISCHARGE

## 2020-05-19 RX ADMIN — SODIUM CHLORIDE 80 MILLILITER(S): 9 INJECTION, SOLUTION INTRAVENOUS at 00:23

## 2020-05-19 RX ADMIN — TAMSULOSIN HYDROCHLORIDE 0.4 MILLIGRAM(S): 0.4 CAPSULE ORAL at 06:19

## 2020-05-19 RX ADMIN — PANTOPRAZOLE SODIUM 40 MILLIGRAM(S): 20 TABLET, DELAYED RELEASE ORAL at 06:19

## 2020-05-19 RX ADMIN — SODIUM ZIRCONIUM CYCLOSILICATE 10 GRAM(S): 10 POWDER, FOR SUSPENSION ORAL at 00:22

## 2020-05-19 RX ADMIN — TAMSULOSIN HYDROCHLORIDE 0.4 MILLIGRAM(S): 0.4 CAPSULE ORAL at 17:39

## 2020-05-19 RX ADMIN — CEFTRIAXONE 100 MILLIGRAM(S): 500 INJECTION, POWDER, FOR SOLUTION INTRAMUSCULAR; INTRAVENOUS at 20:52

## 2020-05-19 RX ADMIN — Medication 650 MILLIGRAM(S): at 19:48

## 2020-05-19 RX ADMIN — Medication 650 MILLIGRAM(S): at 17:38

## 2020-05-19 NOTE — PHYSICAL THERAPY INITIAL EVALUATION ADULT - GENERAL OBSERVATIONS, REHAB EVAL
Pt found semi supine in bed in NAD, +hep lock, +biliary drain, agreeable to PT Eval and cleared by CLYDE Cintron.

## 2020-05-19 NOTE — DISCHARGE NOTE PROVIDER - NSDCFUADDAPPT_GEN_ALL_CORE_FT
Please follow-up with Dr. Peck by calling [phone]. Please follow-up with Dr. Peck by calling [phone].    Please follow up with Dr. Alvarenga for removal of your gallbladder drain.

## 2020-05-19 NOTE — PHYSICAL THERAPY INITIAL EVALUATION ADULT - PLANNED THERAPY INTERVENTIONS, PT EVAL
neuromuscular re-education/postural re-education/strengthening/transfer training/balance training/bed mobility training

## 2020-05-19 NOTE — PHYSICAL THERAPY INITIAL EVALUATION ADULT - IMPAIRMENTS FOUND, PT EVAL
decreased midline orientation/aerobic capacity/endurance/muscle strength/posture/gait, locomotion, and balance

## 2020-05-19 NOTE — PROGRESS NOTE ADULT - PROBLEM SELECTOR PLAN 6
-likely 2/2 RASHAWN  -will give 2 amps of bicarb and then D5W 150 meq bicarb at 50cc overnight -likely 2/2 RASHAWN  -s/p 2 amps of bicarb and then D5W 150 meq bicarb at 50cc overnight.  -bicarb now at 16   -can consider starting bicarb tabs tomorrow

## 2020-05-19 NOTE — PROGRESS NOTE ADULT - SUBJECTIVE AND OBJECTIVE BOX
INTERVAL HPI/OVERNIGHT EVENTS:    OVERNIGHT: No overnight events.  SUBJECTIVE: Patient seen and examined at bedside.     ROS:  CV: Denies chest pain  Resp: Denies SOB  GI: Denies abdominal pain, constipation, diarrhea, nausea, vomiting  : Denies dysuria   ID: Denies fevers, chills  MSK: Denies joint pain     OBJECTIVE:    VITAL SIGNS:  ICU Vital Signs Last 24 Hrs  T(C): 36.4 (19 May 2020 05:04), Max: 36.4 (19 May 2020 05:04)  T(F): 97.5 (19 May 2020 05:04), Max: 97.5 (19 May 2020 05:04)  HR: 71 (19 May 2020 05:04) (71 - 78)  BP: 95/59 (19 May 2020 05:04) (95/59 - 118/78)  BP(mean): --  ABP: --  ABP(mean): --  RR: 17 (19 May 2020 05:04) (16 - 18)  SpO2: 99% (19 May 2020 05:04) (97% - 100%)         @ 07:01  -   @ 07:00  --------------------------------------------------------  IN: 0 mL / OUT: 700 mL / NET: -700 mL     @ 07:01  -  -19 @ 07:45  --------------------------------------------------------  IN: 640 mL / OUT: 200 mL / NET: 440 mL      CAPILLARY BLOOD GLUCOSE      POCT Blood Glucose.: 83 mg/dL (19 May 2020 06:27)      PHYSICAL EXAM:  General: NAD, comfortable  HEENT: NCAT, PERRL, clear conjunctiva, no scleral icterus  Neck: supple, no JVD  Respiratory: CTA b/l, no wheezing, rhonchi, rales  Cardiovascular: RRR, normal S1S2, no M/R/G  Vascular: 2+ radial and DP pulses  Abdomen: soft, NT/ND, bowel sounds in all four quadrants, no palpable masses; drain in place in RUQ  Extremities: WWP, no clubbing, cyanosis, or edema  Skin: No rashes present  Neuro: A&Ox3, moves limbs spontaneously    MEDICATIONS:  MEDICATIONS  (STANDING):  cefTRIAXone   IVPB 1000 milliGRAM(s) IV Intermittent every 24 hours  dextrose 5% 1000 milliLiter(s) (80 mL/Hr) IV Continuous <Continuous>  pantoprazole    Tablet 40 milliGRAM(s) Oral before breakfast  tamsulosin 0.4 milliGRAM(s) Oral two times a day    MEDICATIONS  (PRN):      ALLERGIES:  Allergies    No Known Allergies    Intolerances        LABS:                        8.6    6.77  )-----------( 109      ( 19 May 2020 06:50 )             25.9     -    136  |  110<H>  |  40<H>  ----------------------------<  88  5.2   |  16<L>  |  2.72<H>    Ca    8.8      19 May 2020 06:50  Phos  3.1       Mg     2.0         TPro  6.2  /  Alb  3.0<L>  /  TBili  <0.2  /  DBili  x   /  AST  17  /  ALT  6<L>  /  AlkPhos  57  -18    PT/INR - ( 18 May 2020 15:26 )   PT: 12.6 sec;   INR: 1.10          PTT - ( 18 May 2020 15:26 )  PTT:30.3 sec  Urinalysis Basic - ( 18 May 2020 16:27 )    Color: Yellow / Appearance: Clear / S.025 / pH: x  Gluc: x / Ketone: NEGATIVE  / Bili: Negative / Urobili: 0.2 E.U./dL   Blood: x / Protein: NEGATIVE mg/dL / Nitrite: NEGATIVE   Leuk Esterase: Small / RBC: Many /HPF / WBC 5-10 /HPF   Sq Epi: x / Non Sq Epi: 0-5 /HPF / Bacteria: Present /HPF        RADIOLOGY & ADDITIONAL TESTS: Reviewed.

## 2020-05-19 NOTE — DISCHARGE NOTE PROVIDER - CARE PROVIDERS DIRECT ADDRESSES
,DirectAddress_Unknown ,DirectAddress_Unknown,[email or web address]

## 2020-05-19 NOTE — DISCHARGE NOTE PROVIDER - CARE PROVIDER_API CALL
Nathan Peck  UROLOGY  [address] [place] [address]  Phone: [phone]  Fax: [phone]  Follow Up Time: Nathan Peck  UROLOGY  [address] [place] [address]  Phone: [phone]  Fax: [phone]  Follow Up Time:     Cruz Alvarenga)  Surgery  [address] [place] [address]  Phone: [phone]  Fax: [phone]  Follow Up Time:

## 2020-05-19 NOTE — DISCHARGE NOTE PROVIDER - HOSPITAL COURSE
89M with h/o HTN, GERD, CKD stage 3 admitted for RASHAWN on CKD stage iii and UTI.         # Acute cystitis with hematuria. Plan: -positive UA in the outpatient setting as well as here today    -s/p 7 days of Bactrim DS BID     -will continue with ceftriaxone 1g qd for 5-7 days     -f/u urine culture.            # RASHAWN (acute kidney injury).  Plan: -likely prerenal in the setting of UTI and poor PO intake.     -f/u renal ultrasound    -S/p 1LNS and 1U pRBC     -PVR of >400 cc after urinating 25 cc this afternoon, will perform serial bladder scans             # Hyperkalemia.  Plan: -likely 2/2 worsening RASHAWN and in setting of Bactrim use. Now resolved.     -EKG with known 1st degree AV block    -s/p albuterol, lokelma 10mg    -f/u repeat BMP, AM cortisol.         # Anemia, unspecified type.  Plan: Likely 2/t CKD/AOCD    -no signs of GI bleed, rectal exam with brown stool in ED    -s/p 1u pRBC    -no iron studies performed prior to transfusion, will need further work-up    -monitor catrachita drain for signs of bleeding    -f/u iron studies             # Hyponatremia.  Plan: -likely from poor PO intake. Now resolved.     -TSH wnl    -f/u AM cortisol        #Hypoglycemia    -Resolved with D50     -likely 2/2 poor PO intake vs UTI    -Other ddx: adrenal insuff    -f/u AM cortisol    -monitor finger stick q6hrs.            # Metabolic acidosis, normal anion gap (NAG). Plan: -likely 2/2 RASHAWN    -s/p 2 amps of bicarb and then D5W 150 meq bicarb at 50cc overnight.    -bicarb now at 16     -can consider starting bicarb tabs tomorrow        # Essential hypertension.  Plan: -takes Norvasc 5 and losartan 50 at home    -currently normotensive, restart home meds PRN.         # Benign prostatic hyperplasia, unspecified whether lower urinary tract symptoms present.  Plan: -c/w Flomax 0.4mg bid (home med).         # First degree AV block.  Plan: -EKG with NSR 75 bpm, 1st degree AV block, QTc 406. Known 1st AV block.     -monitor HR, avoid AV itzel blocking agents.         Inpatient treatment course:         Patient was admitted for lethargy for 3-4 days. Presented to the ED and was found to be hypoglycemic to 36, which was resolved with an amp of D50. Also found to have NAMA to 9; hyperkalemia to 6; and a new RASHAWN of 3.25 (baseline of 1.57-1.87 in 12/2019). FENa 1.1% indicating an intrinsic process, which was most likely 2/2 to Bactrim use for the last week (received 14 dosess of DS Bactrim over 7 days). For the hyperkalemia, he was given lokelma and albuterol with a repeat K of 5.2. For the NAMA, patient was given 2 amps of bicarbonate and then started on bicarbonate gtt (D5W with 3 amps bicarb). Also on admission, patient was found to have hgb of 6.9, was subsequently transfused with 1 unit pRBC and now has stable hgb ranging from 8-9. Finally, patient had a UA with bacteruria, WBCs, and leuk esterase; he was then started on a course of ceftriaxone (05/18 - ). Hospital stay was complicated with urinary retention (PVR of ~400cc).         New medications:     Labs to be followed outpatient:     Exam to be followed outpatient: 89M with h/o HTN, GERD, CKD stage 3 admitted for RASHAWN on CKD stage 3 and UTI.         # Acute cystitis with hematuria. Plan: -positive UA in the outpatient setting as well as here today    -s/p 7 days of Bactrim DS BID as outpatient     -will continue with ceftriaxone 1g qd for 5-7 days (start 5/18)      -f/u urine culture.        # RASHAWN (acute kidney injury).  Plan: -likely prerenal in the setting of UTI and poor PO intake.     -f/u renal ultrasound    -S/p 1LNS and 1U pRBC     -PVR of >400 cc after urinating 25 cc this afternoon, will perform serial bladder scans         # Hyperkalemia.  Plan: -likely 2/2 worsening RASHAWN and in setting of Bactrim use. Now resolved.     -EKG with known 1st degree AV block    -s/p albuterol, lokelma 10mg        # Anemia, unspecified type.  Plan: Likely 2/t CKD/AOCD    -no signs of GI bleed, rectal exam with brown stool in ED    -s/p 1u pRBC    -no iron studies performed prior to transfusion, will need further work-up    -monitor catrachita drain for signs of bleeding    -f/u iron studies         # Hyponatremia.  Plan: -likely from poor PO intake. Now resolved.     -TSH wnl    -f/u AM cortisol        #Hypoglycemia    -Resolved with D50     -likely 2/2 poor PO intake vs UTI    -Other ddx: adrenal insuff    -f/u AM cortisol    -monitor finger stick q6hrs.        # Metabolic acidosis, normal anion gap (NAG). Plan: -likely 2/2 RASHAWN    -s/p 2 amps of bicarb and then D5W 150 meq bicarb at 50cc overnight.    -bicarb now at 16; starting oral bicarb tabs 650 BID         # Essential hypertension.  Plan: -takes Norvasc 5 and losartan 50 at home    -currently normotensive, restart home meds PRN.         # Benign prostatic hyperplasia, unspecified whether lower urinary tract symptoms present.  Plan: -c/w Flomax 0.4mg bid (home med).         # First degree AV block.  Plan: -EKG with NSR 75 bpm, 1st degree AV block, QTc 406. Known 1st AV block.     -monitor HR, avoid AV itzel blocking agents.         Inpatient treatment course:         Patient was admitted for lethargy for 3-4 days. Presented to the ED and was found to be hypoglycemic to 36, which was resolved with an amp of D50. Also found to have NAMA to 9; hyperkalemia to 6; and a new RASHAWN of 3.25 (baseline of 1.57-1.87 in 12/2019). FENa 1.1% indicating an intrinsic process, which was most likely 2/2 to Bactrim use for the last week (received 14 dosess of DS Bactrim over 7 days). For the hyperkalemia, he was given lokelma and albuterol with a repeat K of 5.2. For the NAMA, patient was given 2 amps of bicarbonate and then started on bicarbonate gtt (D5W with 3 amps bicarb). Also on admission, patient was found to have hgb of 6.9, was subsequently transfused with 1 unit pRBC and now has stable hgb ranging from 8-9. Finally, patient had a UA with bacteruria, WBCs, and leuk esterase; he was then started on a course of ceftriaxone (05/18 - ). Hospital stay was complicated with urinary retention (PVR of ~400cc).         New medications: Bicarb tabs 650 BID, Cefpdoxime      Labs to be followed outpatient: BMP    Exam to be followed outpatient: Urology outpatient follow up 89M with h/o HTN, GERD, CKD stage 3 admitted for RASHAWN on CKD stage 3 and UTI.         # Acute cystitis with hematuria. Plan: -positive UA in the outpatient setting as well as here today    -s/p 7 days of Bactrim DS BID as outpatient     -will continue with ceftriaxone 1g qd for 5-7 days (start 5/18)      -f/u urine culture.        # RASHAWN (acute kidney injury).  Plan: -likely prerenal in the setting of UTI and poor PO intake.     -f/u renal ultrasound    -S/p 1LNS and 1U pRBC     -PVR of >400 cc after urinating 25 cc this afternoon, will perform serial bladder scans         # Hyperkalemia.  Plan: -likely 2/2 worsening RASHAWN and in setting of Bactrim use. Now resolved.     -EKG with known 1st degree AV block    -s/p albuterol, lokelma 10mg        # Anemia, unspecified type.  Plan: Likely 2/t CKD/AOCD    -no signs of GI bleed, rectal exam with brown stool in ED    -s/p 1u pRBC    -no iron studies performed prior to transfusion, will need further work-up    -monitor catrachita drain for signs of bleeding    -f/u iron studies         # Hyponatremia.  Plan: -likely from poor PO intake. Now resolved.     -TSH wnl    -f/u AM cortisol        #Hypoglycemia    -Resolved with D50     -likely 2/2 poor PO intake vs UTI    -Other ddx: adrenal insuff    -f/u AM cortisol    -monitor finger stick q6hrs.        # Metabolic acidosis, normal anion gap (NAG). Plan: -likely 2/2 RASHAWN    -s/p 2 amps of bicarb and then D5W 150 meq bicarb at 50cc overnight.    -bicarb now at 16; starting oral bicarb tabs 650 BID         # Essential hypertension.  Plan: -takes Norvasc 5 and losartan 50 at home    -currently normotensive, restart home meds PRN.         # Benign prostatic hyperplasia, unspecified whether lower urinary tract symptoms present.  Plan: -c/w Flomax 0.4mg bid (home med).         # First degree AV block.  Plan: -EKG with NSR 75 bpm, 1st degree AV block, QTc 406. Known 1st AV block.     -monitor HR, avoid AV itzel blocking agents.         Inpatient treatment course:         Patient was admitted for lethargy for 3-4 days. Presented to the ED and was found to be hypoglycemic to 36, which was resolved with an amp of D50. Also found to have NAMA to 9; hyperkalemia to 6; and a new RASHAWN of 3.25 (baseline of 1.57-1.87 in 12/2019). FENa 1.1% indicating an intrinsic process, which was most likely 2/2 to Bactrim use for the last week (received 14 dosess of DS Bactrim over 7 days). For the hyperkalemia, he was given lokelma and albuterol with a repeat K of 5.2. For the NAMA, patient was given 2 amps of bicarbonate and then started on bicarbonate gtt (D5W with 3 amps bicarb). Also on admission, patient was found to have hgb of 6.9, was subsequently transfused with 1 unit pRBC and now has stable hgb ranging from 8-9. Finally, patient had a UA with bacteruria, WBCs, and leuk esterase; he was then started on a course of ceftriaxone (05/18 - ). Hospital stay was complicated with urinary retention (PVR of ~400cc).         New medications: Bicarb tabs 650 TID, Cefpdoxime      Labs to be followed outpatient: BMP    Exam to be followed outpatient: Urology outpatient follow up 89M with h/o HTN, GERD, CKD stage 3 admitted for RASHAWN on CKD stage 3 and UTI.         # Acute cystitis with hematuria. Plan:     -positive UA in the outpatient setting as well as on admission    -s/p 7 days of Bactrim DS BID as outpatient     -will continue with ceftriaxone 1g qd for 5-7 days (start 5/18); to finish as outpatient cefpdoxime PO (last day 5/24)      -Urine culture with no growth (in the setting of concurrent antibiotic use)        # RASHAWN (acute kidney injury)    -likely prerenal in the setting of UTI and poor PO intake and post renal in the setting of high PVRs      -S/p 1LNS and 1U pRBC     -PVR of >400 cc after urinating 25 cc; followed with serial bladder scans which  showed continued retention    - Urology aware - stroud placed and to f/u with Dr. Peck as outpatient         # Hyperkalemia.     -likely 2/2 worsening RASHAWN and in setting of Bactrim use. Now resolved.     -EKG with known 1st degree AV block    -s/p albuterol, lokelma 10mg        # Anemia, unspecified type.  Plan: Likely 2/t CKD/AOCD    -no signs of GI bleed, rectal exam with brown stool in ED    -s/p 1u pRBC    -no iron studies performed prior to transfusion, will need further work-up    -monitor catrachita drain for signs of bleeding    -Per renal, would benefit from procrit injections         # Metabolic acidosis, normal anion gap (NAG). Plan: -likely 2/2 RASHAWN    -s/p 2 amps of bicarb and then D5W 150 meq bicarb at 50cc overnight.    -bicarb now at 16; starting oral bicarb tabs 650 TID    - renal made aware; urine lytes sent    - likely a component of mixed pre and post renal azotemia         # Benign prostatic hyperplasia, unspecified whether lower urinary tract symptoms present.      -on home Flomax 0.4mg bid    -increased to .8 at night in addition to starting finasteride per urology given above urinary retention         # First degree AV block.  Plan: -EKG with NSR 75 bpm, 1st degree AV block, QTc 406. Known 1st AV block.     -monitor HR, avoid AV itzel blocking agents.         Inpatient treatment course:         Patient was admitted for lethargy for 3-4 days. Presented to the ED and was found to be hypoglycemic to 36, which was resolved with an amp of D50. Also found to have NAMA to 9; hyperkalemia to 6; and a new RASHAWN of 3.25 (baseline of 1.57-1.87 in 12/2019). FENa 1.1% indicating an intrinsic process, which was most likely 2/2 to Bactrim use for the last week (received 14 dosess of DS Bactrim over 7 days). For the hyperkalemia, he was given lokelma and albuterol with a repeat K of 5.2. For the NAMA, patient was given 2 amps of bicarbonate and then started on bicarbonate gtt (D5W with 3 amps bicarb). Also on admission, patient was found to have hgb of 6.9, was subsequently transfused with 1 unit pRBC and now has stable hgb ranging from 8-9. Finally, patient had a UA with bacteruria, WBCs, and leuk esterase; he was then started on a course of ceftriaxone (05/18 - ). Hospital stay was complicated with urinary retention (PVR of ~400cc).         New medications: Bicarb tabs 650 TID, Cefpdoxime      Labs to be followed outpatient: BMP    Exam to be followed outpatient: Urology outpatient follow up

## 2020-05-19 NOTE — PROGRESS NOTE ADULT - PROBLEM SELECTOR PLAN 1
-positive UA in the outpatient setting as well as here today  -s/p 1 day of bactrim  -will continue with ceftriaxone 1g qd (05/18 - )  -f/u urine culture -positive UA in the outpatient setting as well as here today  -s/p 7 days of Bactrim DS BID   -will continue with ceftriaxone 1g qd for 5-7 days   -f/u urine culture.

## 2020-05-19 NOTE — CONSULT NOTE ADULT - ASSESSMENT
per Internal Medicine    89M with h/o HTN, GERD, CKD stage 3 admitted for RASHAWN on CKD stage iii and UTI.     Problem/Plan - 1:  ·  Problem: Acute cystitis with hematuria.  Plan: -positive UA in the outpatient setting as well as here today  -s/p 1 day of bactrim  -will continue with ceftriaxone 1g qd (05/18 - )  -f/u urine culture.     Problem/Plan - 2:  ·  Problem: RASHAWN (acute kidney injury).  Plan: -likely prerenal in the setting of UTI and poor PO intake.   -Other ddx: multiple myeloma given anemia and RASHAWN, but there's no hypercalcemia  -FENA 1.1% intrinsic, FEUrea 35.7% intrinsic (urine lytes taken after 1L NS)  -f/u renal ultrasound  -S/p 1LNS and 1U pRBC   -creatinine downtrending this AM   -s/p Bettencourt and is producing urine spontaenously.     Problem/Plan - 3:  ·  Problem: Hyperkalemia.  Plan: -likely 2/2 worsening RASHAWN. Presented with K of 6 and this AM was at 5.2. ddx: adrenal insufficiency given hyponatremia, hyperkalemia and hypoglycemia. EKG with known 1st degree AV block  -s/p albuterol, lokelma 10mg  -f/u repeat BMP, AM cortisol.     Problem/Plan - 4:  ·  Problem: Anemia, unspecified type.  Plan: Likely 2/t CKD/AOCD  -no signs of GI bleed, rectal exam with brown stool in ED  -s/p 1u pRBC (5/18)   -no iron studies performed prior to transfusion, will need further work-up  -monitor catrachita drain for signs of bleeding  -f/u iron studies, holding off SPEP, UPEP, immunofixation  -AM cbc with hgb of 8.6.     Problem/Plan - 5:  ·  Problem: Hyponatremia.  Plan: -likely from poor PO intake  -Other ddx: SIADH vs adrenal insuff, urine Na 50, urine Osm 384, measured serum osm 279 (lytes sent after 1L NS)  -TSH wnl  -f/u AM cortisol    #Hypoglycemia  -Resolved with D50   -likely 2/2 poor PO intake vs UTI  -Other ddx: adrenal insuff  -f/u AM cortisol  -monitor finger stick q6hrs.     Problem/Plan - 6:  Problem: Metabolic acidosis, normal anion gap (NAG). Plan: -likely 2/2 RASHAWN  -will give 2 amps of bicarb and then D5W 150 meq bicarb at 50cc overnight.    Problem/Plan - 7:  ·  Problem: Essential hypertension.  Plan: -takes Norvasc 5 and losartan 50 at home  -currently normotensive, restart home meds PRN.     Problem/Plan - 8:  ·  Problem: Benign prostatic hyperplasia, unspecified whether lower urinary tract symptoms present.  Plan: -c/w Flomax 0.4mg bid (home med).     Problem/Plan - 9:  ·  Problem: First degree AV block.  Plan: -EKG with NSR 75 bpm, 1st degree AV block, QTc 406. Known 1st AV block.   -monitor HR, avoid AV itzel blocking agents.     Problem/Plan - 10:  Problem: Prophylactic measure. Plan; -DVT: SCD  -GI: pantoprazole (home med)

## 2020-05-19 NOTE — PROGRESS NOTE ADULT - PROBLEM SELECTOR PLAN 2
-likely prerenal in the setting of UTI and poor PO intake.   -Other ddx: multiple myeloma given anemia and RASHAWN, but there's no hypercalcemia  -FENA 1.1% intrinsic, FEUrea 35.7% intrinsic (urine lytes taken after 1L NS)  -f/u renal ultrasound  -S/p 1LNS and 1U pRBC   -creatinine downtrending this AM   -s/p Bettencourt and is producing urine spontaenously - fena 1.1%, indicating intrinsic etiology; could be 2/2 to use of bactrim   -f/u renal ultrasound  -S/p 1LNS and 1U pRBC   -PVR of >400 cc after urinating 25 cc this afternoon, will perform serial bladder scans

## 2020-05-19 NOTE — CONSULT NOTE ADULT - SUBJECTIVE AND OBJECTIVE BOX
Patient is a 89y old  Male who presents with a chief complaint of      HPI:  89M with h/o HTN, GERD, CKD stage 3, BPH presenting with generalized weakness, decreased PO intake and lethargy for past 3-4 days. Patient lives alone, his son and step-daughter checks upon him daily. He was noted to be lethargic, mostly staying in his bed with decreased PO intake. He reports having urinary urgency during this time. He went to see his urologist last week. He received a call from his urologist on Monday stating he has UTI. He took bactrim for one day. Subsequently he was sent to the ED. Denies melena or hematochezia. Off note, patient was admitted last year for acute cholecystitis and E coli bacteremia. He had perc catrachita with IR, treated with ceftriaxone and flagyl.     EMS reports fingerstick of 36, gave an amp of dextrose. In the ED, vitals T96.3, P73, 105/52, R18, 98%RA. He received 1u pRBC, 1L NS, 2 amps dextrose and albuterol. Bettencourt catheter placed, drained about 200-300cc. He was admitted to [facility] for further care.     ROS: Urinary urgency resolved. No fever, chills, SOB, chest pain, palpitations, abdominal pain, n/v or diarrhea. (18 May 2020 19:24)      PAST MEDICAL & SURGICAL HISTORY:  BPH (benign prostatic hyperplasia)  GERD (gastroesophageal reflux disease)  HTN (hypertension)  H/O surgical procedure: percutaneous cholecystostomy      MEDICATIONS  (STANDING):  cefTRIAXone   IVPB 1000 milliGRAM(s) IV Intermittent every 24 hours  dextrose 5% 1000 milliLiter(s) (80 mL/Hr) IV Continuous <Continuous>  pantoprazole    Tablet 40 milliGRAM(s) Oral before breakfast  tamsulosin 0.4 milliGRAM(s) Oral two times a day    MEDICATIONS  (PRN):    FAMILY HISTORY:  Family history of essential hypertension      CBC Full  -  ( 19 May 2020 06:50 )  WBC Count : 6.77 K/uL  RBC Count : 3.02 M/uL  Hemoglobin : 8.6 g/dL  Hematocrit : 25.9 %  Platelet Count - Automated : 109 K/uL  Mean Cell Volume : 85.8 fl  Mean Cell Hemoglobin : 28.5 pg  Mean Cell Hemoglobin Concentration : 33.2 gm/dL  Auto Neutrophil # : 4.13 K/uL  Auto Lymphocyte # : 1.44 K/uL  Auto Monocyte # : 0.64 K/uL  Auto Eosinophil # : 0.47 K/uL  Auto Basophil # : 0.03 K/uL  Auto Neutrophil % : 61.0 %  Auto Lymphocyte % : 21.3 %  Auto Monocyte % : 9.5 %  Auto Eosinophil % : 6.9 %  Auto Basophil % : 0.4 %          136  |  110<H>  |  40<H>  ----------------------------<  88  5.2   |  16<L>  |  2.72<H>    Ca    8.8      19 May 2020 06:50  Phos  3.1       Mg     2.0         TPro  6.2  /  Alb  3.0<L>  /  TBili  <0.2  /  DBili  x   /  AST  17  /  ALT  6<L>  /  AlkPhos  57        Urinalysis Basic - ( 18 May 2020 16:27 )    Color: Yellow / Appearance: Clear / S.025 / pH: x  Gluc: x / Ketone: NEGATIVE  / Bili: Negative / Urobili: 0.2 E.U./dL   Blood: x / Protein: NEGATIVE mg/dL / Nitrite: NEGATIVE   Leuk Esterase: Small / RBC: Many /HPF / WBC 5-10 /HPF   Sq Epi: x / Non Sq Epi: 0-5 /HPF / Bacteria: Present /HPF          Radiology:    < from: Xray Chest 1 View-PORTABLE IMMEDIATE (20 @ 15:46) >  EXAM:  XR CHEST PORTABLE IMMED 1V                          PROCEDURE DATE:  2020          INTERPRETATION:  XR CHEST IMMEDIATE dated 2020 3:46 PM    CLINICAL INFORMATION: Male, 89 years old.  Sepsis.    PRIOR STUDIES: 10/18/2019    FINDINGS: Persistent mild cardiomegaly with left ventricular prominence. Visualized lung zones are clear. Soft tissues and osseous structures are intact. Degenerative changes of both glenohumeral joints with superior subluxation of the right humeral head unchanged.    IMPRESSION:  No acute pulmonary pathology.        Vital Signs Last 24 Hrs  T(C): 36.4 (19 May 2020 09:27), Max: 36.4 (19 May 2020 05:04)  T(F): 97.5 (19 May 2020 09:27), Max: 97.5 (19 May 2020 05:04)  HR: 86 (19 May 2020 09:27) (71 - 86)  BP: 109/63 (19 May 2020 09:27) (95/59 - 118/78)  BP(mean): --  RR: 18 (19 May 2020 09:27) (16 - 18)  SpO2: 97% (19 May 2020 09:27) (97% - 100%)    REVIEW OF SYSTEMS:    CONSTITUTIONAL:  weakness  EYES: No eye pain, visual disturbances, or discharge  ENMT:  No difficulty hearing, tinnitus, vertigo; No sinus or throat pain  NECK: No pain or stiffness  BREASTS: No pain, masses, or nipple discharge  RESPIRATORY: No cough, wheezing, chills or hemoptysis; No shortness of breath  CARDIOVASCULAR: No chest pain, palpitations, dizziness, or leg swelling  GASTROINTESTINAL: No abdominal or epigastric pain. No nausea, vomiting, or hematemesis; No diarrhea or constipation. No melena or hematochezia.  GENITOURINARY: No dysuria, frequency, hematuria, or incontinence  NEUROLOGICAL: No headaches, memory loss, loss of strength, numbness, or tremors  SKIN: No itching, burning, rashes, or lesions   LYMPH NODES: No enlarged glands  ENDOCRINE: No heat or cold intolerance; No hair loss  MUSCULOSKELETAL: No joint pain or swelling; No muscle, back, or extremity pain  PSYCHIATRIC: No depression, anxiety, mood swings, or difficulty sleeping  HEME/LYMPH: No easy bruising, or bleeding gums  ALLERGY AND IMMUNOLOGIC: No hives or eczema  VASCULAR: no swelling, erythema        Physical Exam:   89 yo AA gentleman lying in semi Lim's position, no acute complaints    Head: normocephalic, atraumatic    Eyes: PERRLA, EOMI, no nystagmus, sclera anicteric    ENT: nasal discharge, uvula midline, no oropharyngeal erythema/exudate    Neck: supple, negative JVD, negative carotid bruits, no thyromegaly    Chest: CTA bilaterally, neg wheeze/ rhonchi/ rales/ crackles/ egophany    Cardiovascular: regular rate and rhythm, neg murmurs/rubs/gallops    Abdomen: soft, non distended, non tender to palpation in all 4 quadrants, negative rebound/guarding, normal bowel sounds    Extremities: WWP, neg cyanosis/clubbing/edema, negative calf tenderness to palpation, negative Maurice's sign      :     Neurologic Exam:    Alert and oriented x 2 to person, place, 2010 date/year, speech fluent w/o dysarthria, recent and remote memory intact, repetition intact, comprehension intact    Cranial Nerves:     II:                       pupils equal, round and reactive to light, visual fields intact   III/ IV/VI:            extraocular movements intact, neg nystagmus, neg ptosis  V:                       facial sensation intact, V1-3 normal  VII:                     face symmetric, no droop, normal eye closure and smile  VIII:                    hearing intact to finger rub bilaterally  IX/ X:                 soft palate rise symmetrical  XI:                      head turning, shoulder shrug normal  XII:                     tongue midline    Motor Exam:    Upper Extremities:     RIght:   no focal weakness > 3+/5    Left :    no focal weakness > 3+/5    Lower Extremities:                 Right:    no focal weakness > 3+/5                 Left:       no focal weakness > 3+/5                 Sensory:    intact to LT/PP in all UE/LE dermatomes                     DTR:             = biceps/     triceps/     brachioradialis                      = patella/   medial hamstring/ankle                      neg clonus                      neg Babinski                        Gait:  not tested        PM&R Impression:    1) deconditioned  2) no focal weakness    Plan:    1) Physical therapy focusing on therapeutic exercises, bed mobility/transfer out of bed evaluation, progressive ambulation with assistive devices prn.    2) Anticipated Disposition Plan/Recs:  pending functional progress

## 2020-05-19 NOTE — DISCHARGE NOTE PROVIDER - NSDCMRMEDTOKEN_GEN_ALL_CORE_FT
Flomax 0.4 mg oral capsule: 1 cap(s) orally 2 times a day  omeprazole 20 mg oral delayed release capsule: 1 cap(s) orally once a day cefpodoxime 100 mg oral tablet: 1 tab(s) orally once a day   Flomax 0.4 mg oral capsule: 1 cap(s) orally 2 times a day  omeprazole 20 mg oral delayed release capsule: 1 cap(s) orally once a day  sodium bicarbonate 650 mg oral tablet: 1 tab(s) orally every 8 hours cefpodoxime 100 mg oral tablet: 1 tab(s) orally once a day; last day 5/24   finasteride 5 mg oral tablet: 1 tab(s) orally once a day  Flomax 0.4 mg oral capsule: 1 cap(s) orally 2 times a day  omeprazole 20 mg oral delayed release capsule: 1 cap(s) orally once a day  sodium bicarbonate 650 mg oral tablet: 1 tab(s) orally every 8 hours

## 2020-05-19 NOTE — PHYSICAL THERAPY INITIAL EVALUATION ADULT - ADDITIONAL COMMENTS
Pt reports living alone in an elevator access apartment. Pt reports being independent with most ADL's and functional mobility using a rollator. Pt reports his son frequently checks in on him to assist with ADL's, however reports he will be staying with him 24/7 upon D/C from [facility], to assist with mobility and ADL's as needed.

## 2020-05-19 NOTE — PROGRESS NOTE ADULT - PROBLEM SELECTOR PLAN 4
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

## 2020-05-19 NOTE — DISCHARGE NOTE PROVIDER - NSDCFUSCHEDAPPT_GEN_ALL_CORE_FT
Called refill in.    EARNEST RENAE ; 07/17/2020 ; [facility] [address] [place] [address] EARNEST RENAE ; 07/17/2020 ; [facility] [address] [place] [address] EARNEST RENAE ; 07/17/2020 ; [facility] [address] [place] [address] EARNEST RENAE ; 07/17/2020 ; P [facility] [address] [place] [address] EARNEST RENAE ; 07/17/2020 ; P [facility] [address] [place] [address] EARNEST RENAE ; 07/17/2020 ; P [facility] [address] [place] [address] EARNEST RENAE ; 07/17/2020 ; P [facility] [address] [place] [address] EARNEST RENAE ; 07/17/2020 ; P [facility] [address] [place] [address]

## 2020-05-19 NOTE — DISCHARGE NOTE PROVIDER - NSDCCPCAREPLAN_GEN_ALL_CORE_FT
PRINCIPAL DISCHARGE DIAGNOSIS  Diagnosis: Acute cystitis  Assessment and Plan of Treatment: You presented with pain on urination. Previously, you were given a 7-day course of bactrim, which you finished taking prior to your admission to [facility]. When you came, you continued to complain of pain on urination. In addition, your lab tests were found be positive for an infection. We then started you on intravenous antibiotics, and then transitioned you to oral medications. When you leave, please follow-up with your primary care doctor and your urologist.      SECONDARY DISCHARGE DIAGNOSES  Diagnosis: Metabolic acidosis  Assessment and Plan of Treatment: You presented with an electrolyte imbalance called metabolic acidosis. This was most likely due to the injury imposed on your kidneys by the bactrim you were taking. We gave you bicarbonate to fix the imbalance. Moving forward, you may want to discuss with your primary care doctor about starting bicarbonate tablets to keep this electrolyte in check.    Diagnosis: Hyponatremia  Assessment and Plan of Treatment: Your sodium, an electrolyte, was low when you presented to the hospital. The most likely cause was poor oral intake. Please continue to eat and drink reguarly. Please follow-up with your primary care doctor after leaving the hospital.    Diagnosis: Hyperkalemia  Assessment and Plan of Treatment: You presented with a potassium that was very elevated. This is a problem because if your blood has high potassium, it can lead to heart problems. We corrected this by giving you albuterol and a medication that reduces the potassium in the blood. Please follow-up with your primary care doctor after leaving the hospital.    Diagnosis: BPH (benign prostatic hyperplasia)  Assessment and Plan of Treatment: You have a history of an enlarged prostate. We continued you on your home medications. Please follow-up with your urologist when you leave the hospital.    Diagnosis: HTN (hypertension)  Assessment and Plan of Treatment: You have a history of hypertension. You have home medications that you take. While here, we stopped your home medications because your blood pressure was under control. Please follow-up with your primary care doctor when you leave the hospital.    Diagnosis: RASHAWN (acute kidney injury)  Assessment and Plan of Treatment: You presented with an acute injury to you kidneys. On review of your history and medications, we believe the most likely cause was the bactrim you were prescribed. You finished that course of antibiotics, and we started you on a different type  of antibiotics. We gave you fluids and electrolytes to stabilize the electrolyte imbalances in your blood. Over time, your creatinine (a measure of your kidney health) improved, indicating that the injury was most likely due to the Bactrim you had been taking. When you leave, please follow-up with your urologist and primary care doctor.    Diagnosis: Anemia  Assessment and Plan of Treatment: When you came to the hospital, one of your complaints was fatigue. You hemoglobin (which is the molecule responsible for transporting blood from the lungs to the rest of the body) was low. When this is low, it can lead to symptoms like fatigue. We gave you 1 unit of red blood cells, and afterwards, your hemoglobin was in an acceptable range. In addition, we did not find any sources of bleeding. When you leave, please follow-up with your primary care doctor.    Diagnosis: Hypoglycemia  Assessment and Plan of Treatment: We found that the amount of sugar in your blood was low. This was most likely due to the fact that you were not eating much. We gave you some intravenous glucose so that your sugars would improve. Again, when your blood sugar is low, it can lead to symptoms like fatigue. To prevent this from happening in the future, please continue to eat reguarly. PRINCIPAL DISCHARGE DIAGNOSIS  Diagnosis: Acute cystitis  Assessment and Plan of Treatment: You presented with pain on urination. Previously, you were given a 7-day course of bactrim, which you finished taking prior to your admission to [facility]. When you came, you continued to complain of pain on urination. In addition, your lab tests were found be positive for an infection. We then started you on intravenous antibiotics, and then transitioned you to oral medications. The last day of antibiotics is May 24th. We also placed a stroud catheter because you were retatining urine. When you leave, please follow-up with your primary care doctor and your urologist.      SECONDARY DISCHARGE DIAGNOSES  Diagnosis: Metabolic acidosis  Assessment and Plan of Treatment: You presented with an electrolyte imbalance called metabolic acidosis. This was most likely due to the injury imposed on your kidneys by the bactrim you were taking. We gave you bicarbonate to fix the imbalance and started you on bicarbonate tabs, which you should continue as an outpatient. Moving forward, you may want to discuss with your primary care doctor about continuing the tablets to keep this electrolyte in check.    Diagnosis: Hyponatremia  Assessment and Plan of Treatment: Your sodium, an electrolyte, was low when you presented to the hospital. The most likely cause was poor oral intake. Please continue to eat and drink reguarly. Please follow-up with your primary care doctor after leaving the hospital.    Diagnosis: Hyperkalemia  Assessment and Plan of Treatment: You presented with a potassium that was very elevated. This is a problem because if your blood has high potassium, it can lead to heart problems. We corrected this by giving you albuterol and a medication that reduces the potassium in the blood. Please follow-up with your primary care doctor after leaving the hospital.    Diagnosis: BPH (benign prostatic hyperplasia)  Assessment and Plan of Treatment: You have a history of an enlarged prostate. We continued you on your home medications. Please follow-up with your urologist when you leave the hospital.    Diagnosis: HTN (hypertension)  Assessment and Plan of Treatment: You have a history of hypertension. You have home medications that you take. While here, we stopped your home medications because your blood pressure was under control. Please follow-up with your primary care doctor when you leave the hospital.    Diagnosis: RASHAWN (acute kidney injury)  Assessment and Plan of Treatment: You presented with an acute injury to you kidneys. On review of your history and medications, we believe the most likely cause was the bactrim you were prescribed. You finished that course of antibiotics, and we started you on a different type  of antibiotics. We gave you fluids and electrolytes to stabilize the electrolyte imbalances in your blood. Over time, your creatinine (a measure of your kidney health) improved, indicating that the injury was most likely due to the Bactrim you had been taking. When you leave, please follow-up with your urologist and primary care doctor.    Diagnosis: Anemia  Assessment and Plan of Treatment: When you came to the hospital, one of your complaints was fatigue. You hemoglobin (which is the molecule responsible for transporting blood from the lungs to the rest of the body) was low. When this is low, it can lead to symptoms like fatigue. We gave you 1 unit of red blood cells, and afterwards, your hemoglobin was in an acceptable range. In addition, we did not find any sources of bleeding. When you leave, please follow-up with your primary care doctor.    Diagnosis: Hypoglycemia  Assessment and Plan of Treatment: We found that the amount of sugar in your blood was low. This was most likely due to the fact that you were not eating much. We gave you some intravenous glucose so that your sugars would improve. Again, when your blood sugar is low, it can lead to symptoms like fatigue. To prevent this from happening in the future, please continue to eat reguarly. PRINCIPAL DISCHARGE DIAGNOSIS  Diagnosis: Acute cystitis  Assessment and Plan of Treatment: You presented with pain on urination. Previously, you were given a 7-day course of bactrim, which you finished taking prior to your admission to [facility]. When you came, you continued to complain of pain on urination. In addition, your lab tests were found be positive for an infection. We then started you on intravenous antibiotics, and then transitioned you to oral medications. The last day of antibiotics is May 24th. We also placed a stroud catheter because you were retatining urine. When you leave, please follow-up with your primary care doctor and your urologist.      SECONDARY DISCHARGE DIAGNOSES  Diagnosis: Metabolic acidosis  Assessment and Plan of Treatment: You presented with an electrolyte imbalance called metabolic acidosis. This was most likely due to the injury imposed on your kidneys by the bactrim you were taking. We gave you bicarbonate to fix the imbalance and started you on bicarbonate tabs, which you should continue as an outpatient. Moving forward, you may want to discuss with your primary care doctor about continuing the tablets to keep this electrolyte in check.    Diagnosis: Hyponatremia  Assessment and Plan of Treatment: Your sodium, an electrolyte, was low when you presented to the hospital. The most likely cause was poor oral intake. Please continue to eat and drink reguarly. Please follow-up with your primary care doctor after leaving the hospital.    Diagnosis: Hyperkalemia  Assessment and Plan of Treatment: You presented with a potassium that was very elevated. This is a problem because if your blood has high potassium, it can lead to heart problems. We corrected this by giving you albuterol and a medication that reduces the potassium in the blood. Please follow-up with your primary care doctor after leaving the hospital.    Diagnosis: BPH (benign prostatic hyperplasia)  Assessment and Plan of Treatment: You have a history of an enlarged prostate. We continued you on your home medications. Please follow-up with your urologist when you leave the hospital.    Diagnosis: HTN (hypertension)  Assessment and Plan of Treatment: You have a history of hypertension. You have home medications that you take. While here, we stopped your home medications because your blood pressure was under control. Please follow-up with your primary care doctor when you leave the hospital.    Diagnosis: RASHAWN (acute kidney injury)  Assessment and Plan of Treatment: You presented with an acute injury to you kidneys. On review of your history and medications, we believe the most likely cause was the bactrim you were prescribed. You finished that course of antibiotics, and we started you on a different type  of antibiotics. We gave you fluids and electrolytes to stabilize the electrolyte imbalances in your blood. Over time, your creatinine (a measure of your kidney health) improved, indicating that the injury was most likely due to the Bactrim you had been taking. When you leave, please follow-up with your urologist and primary care doctor.    Diagnosis: Anemia  Assessment and Plan of Treatment: When you came to the hospital, one of your complaints was fatigue. You hemoglobin (which is the molecule responsible for transporting blood from the lungs to the rest of the body) was low. When this is low, it can lead to symptoms like fatigue. We gave you 1 unit of red blood cells, and afterwards, your hemoglobin was in an acceptable range. In addition, we did not find any sources of bleeding. When you leave, please follow-up with your primary care doctor.    Diagnosis: Hypoglycemia  Assessment and Plan of Treatment: We found that the amount of sugar in your blood was low. This was most likely due to the fact that you were not eating much. We gave you some intravenous glucose so that your sugars would improve. Again, when your blood sugar is low, it can lead to symptoms like fatigue. To prevent this from happening in the future, please continue to eat reguarly.

## 2020-05-19 NOTE — PROGRESS NOTE ADULT - PROBLEM SELECTOR PLAN 5
-likely from poor PO intake  -Other ddx: SIADH vs adrenal insuff, urine Na 50, urine Osm 384, measured serum osm 279 (lytes sent after 1L NS)  -TSH wnl  -f/u AM cortisol    #Hypoglycemia  -Resolved with D50   -likely 2/2 poor PO intake vs UTI  -Other ddx: adrenal insuff  -f/u AM cortisol  -monitor finger stick q6hrs -likely from poor PO intake. Resolved.   -Other ddx: SIADH vs adrenal insuff, urine Na 50, urine Osm 384, measured serum osm 279 (lytes sent after 1L NS)  -TSH wnl  -f/u AM cortisol    #Hypoglycemia  -Resolved with D50   -likely 2/2 poor PO intake vs UTI  -Other ddx: adrenal insuff  -f/u AM cortisol  -monitor finger stick q6hrs

## 2020-05-19 NOTE — DISCHARGE NOTE NURSING/CASE MANAGEMENT/SOCIAL WORK - PATIENT PORTAL LINK FT
You can access the FollowMyHealth Patient Portal offered by [facility] by registering at the following website: [email or web address]. By joining [facility]’s FollowMyHealth portal, you will also be able to view your health information using other applications (apps) compatible with our system.

## 2020-05-19 NOTE — DISCHARGE NOTE PROVIDER - PROVIDER TOKENS
PROVIDER:[TOKEN:[[id number]]] PROVIDER:[TOKEN:[[id number]]],PROVIDER:[TOKEN:[[id number]]]

## 2020-05-20 LAB
ANION GAP SERPL CALC-SCNC: 9 MMOL/L — SIGNIFICANT CHANGE UP (ref 5–17)
APAP SERPL-MCNC: <5 UG/ML — LOW (ref 10–30)
APPEARANCE UR: CLEAR — SIGNIFICANT CHANGE UP
B-OH-BUTYR SERPL-SCNC: 0.7 MMOL/L — HIGH
BACTERIA # UR AUTO: PRESENT /HPF
BILIRUB UR-MCNC: NEGATIVE — SIGNIFICANT CHANGE UP
BUN SERPL-MCNC: 28 MG/DL — HIGH (ref 7–23)
CALCIUM SERPL-MCNC: 8.5 MG/DL — SIGNIFICANT CHANGE UP (ref 8.4–10.5)
CHLORIDE SERPL-SCNC: 109 MMOL/L — HIGH (ref 96–108)
CHLORIDE UR-SCNC: 75 MMOL/L — SIGNIFICANT CHANGE UP
CO2 SERPL-SCNC: 16 MMOL/L — LOW (ref 22–31)
COLOR SPEC: YELLOW — SIGNIFICANT CHANGE UP
CREAT SERPL-MCNC: 2.05 MG/DL — HIGH (ref 0.5–1.3)
DIFF PNL FLD: ABNORMAL
EPI CELLS # UR: SIGNIFICANT CHANGE UP /HPF (ref 0–5)
GLUCOSE BLDC GLUCOMTR-MCNC: 105 MG/DL — HIGH (ref 70–99)
GLUCOSE BLDC GLUCOMTR-MCNC: 110 MG/DL — HIGH (ref 70–99)
GLUCOSE BLDC GLUCOMTR-MCNC: 113 MG/DL — HIGH (ref 70–99)
GLUCOSE BLDC GLUCOMTR-MCNC: 117 MG/DL — HIGH (ref 70–99)
GLUCOSE BLDC GLUCOMTR-MCNC: 79 MG/DL — SIGNIFICANT CHANGE UP (ref 70–99)
GLUCOSE SERPL-MCNC: 84 MG/DL — SIGNIFICANT CHANGE UP (ref 70–99)
GLUCOSE UR QL: NEGATIVE — SIGNIFICANT CHANGE UP
HCT VFR BLD CALC: 28.4 % — LOW (ref 39–50)
HGB BLD-MCNC: 9.5 G/DL — LOW (ref 13–17)
KETONES UR-MCNC: NEGATIVE — SIGNIFICANT CHANGE UP
LEUKOCYTE ESTERASE UR-ACNC: NEGATIVE — SIGNIFICANT CHANGE UP
MAGNESIUM SERPL-MCNC: 1.8 MG/DL — SIGNIFICANT CHANGE UP (ref 1.6–2.6)
MCHC RBC-ENTMCNC: 28.5 PG — SIGNIFICANT CHANGE UP (ref 27–34)
MCHC RBC-ENTMCNC: 33.5 GM/DL — SIGNIFICANT CHANGE UP (ref 32–36)
MCV RBC AUTO: 85.3 FL — SIGNIFICANT CHANGE UP (ref 80–100)
NITRITE UR-MCNC: NEGATIVE — SIGNIFICANT CHANGE UP
NRBC # BLD: 0 /100 WBCS — SIGNIFICANT CHANGE UP (ref 0–0)
PH UR: 6 — SIGNIFICANT CHANGE UP (ref 5–8)
PHOSPHATE SERPL-MCNC: 3.1 MG/DL — SIGNIFICANT CHANGE UP (ref 2.5–4.5)
PLATELET # BLD AUTO: 125 K/UL — LOW (ref 150–400)
POTASSIUM SERPL-MCNC: 4.7 MMOL/L — SIGNIFICANT CHANGE UP (ref 3.5–5.3)
POTASSIUM SERPL-SCNC: 4.7 MMOL/L — SIGNIFICANT CHANGE UP (ref 3.5–5.3)
POTASSIUM UR-SCNC: 9 MMOL/L — SIGNIFICANT CHANGE UP
PROT UR-MCNC: NEGATIVE MG/DL — SIGNIFICANT CHANGE UP
RBC # BLD: 3.33 M/UL — LOW (ref 4.2–5.8)
RBC # FLD: 16.2 % — HIGH (ref 10.3–14.5)
RBC CASTS # UR COMP ASSIST: ABNORMAL /HPF
SALICYLATES SERPL-MCNC: <0.3 MG/DL — LOW (ref 2.8–20)
SODIUM SERPL-SCNC: 134 MMOL/L — LOW (ref 135–145)
SODIUM UR-SCNC: 92 MMOL/L — SIGNIFICANT CHANGE UP
SP GR SPEC: 1.02 — SIGNIFICANT CHANGE UP (ref 1–1.03)
UROBILINOGEN FLD QL: 0.2 E.U./DL — SIGNIFICANT CHANGE UP
WBC # BLD: 7.03 K/UL — SIGNIFICANT CHANGE UP (ref 3.8–10.5)
WBC # FLD AUTO: 7.03 K/UL — SIGNIFICANT CHANGE UP (ref 3.8–10.5)
WBC UR QL: < 5 /HPF — SIGNIFICANT CHANGE UP

## 2020-05-20 PROCEDURE — 99223 1ST HOSP IP/OBS HIGH 75: CPT

## 2020-05-20 RX ORDER — TAMSULOSIN HYDROCHLORIDE 0.4 MG/1
0.8 CAPSULE ORAL AT BEDTIME
Refills: 0 | Status: DISCONTINUED | OUTPATIENT
Start: 2020-05-20 | End: 2020-05-21

## 2020-05-20 RX ORDER — SODIUM CHLORIDE 9 MG/ML
1000 INJECTION, SOLUTION INTRAVENOUS
Refills: 0 | Status: DISCONTINUED | OUTPATIENT
Start: 2020-05-20 | End: 2020-05-21

## 2020-05-20 RX ORDER — SODIUM BICARBONATE 1 MEQ/ML
650 SYRINGE (ML) INTRAVENOUS EVERY 8 HOURS
Refills: 0 | Status: DISCONTINUED | OUTPATIENT
Start: 2020-05-20 | End: 2020-05-21

## 2020-05-20 RX ORDER — SODIUM BICARBONATE 1 MEQ/ML
1 SYRINGE (ML) INTRAVENOUS
Qty: 90 | Refills: 0
Start: 2020-05-20 | End: 2020-06-18

## 2020-05-20 RX ORDER — FINASTERIDE 5 MG/1
5 TABLET, FILM COATED ORAL DAILY
Refills: 0 | Status: DISCONTINUED | OUTPATIENT
Start: 2020-05-20 | End: 2020-05-21

## 2020-05-20 RX ORDER — CEFPODOXIME PROXETIL 100 MG
1 TABLET ORAL
Qty: 5 | Refills: 0
Start: 2020-05-20 | End: 2020-05-24

## 2020-05-20 RX ADMIN — FINASTERIDE 5 MILLIGRAM(S): 5 TABLET, FILM COATED ORAL at 15:34

## 2020-05-20 RX ADMIN — TAMSULOSIN HYDROCHLORIDE 0.8 MILLIGRAM(S): 0.4 CAPSULE ORAL at 21:58

## 2020-05-20 RX ADMIN — CEFTRIAXONE 100 MILLIGRAM(S): 500 INJECTION, POWDER, FOR SOLUTION INTRAMUSCULAR; INTRAVENOUS at 21:57

## 2020-05-20 RX ADMIN — Medication 650 MILLIGRAM(S): at 07:16

## 2020-05-20 RX ADMIN — SODIUM CHLORIDE 50 MILLILITER(S): 9 INJECTION, SOLUTION INTRAVENOUS at 11:56

## 2020-05-20 RX ADMIN — Medication 650 MILLIGRAM(S): at 21:58

## 2020-05-20 RX ADMIN — Medication 650 MILLIGRAM(S): at 15:34

## 2020-05-20 RX ADMIN — PANTOPRAZOLE SODIUM 40 MILLIGRAM(S): 20 TABLET, DELAYED RELEASE ORAL at 07:16

## 2020-05-20 RX ADMIN — TAMSULOSIN HYDROCHLORIDE 0.4 MILLIGRAM(S): 0.4 CAPSULE ORAL at 07:16

## 2020-05-20 NOTE — PROGRESS NOTE ADULT - PROBLEM SELECTOR PLAN 1
-positive UA in the outpatient setting as well as inpatient  -s/p 7 days of Bactrim DS BID as outpatient   -will continue with ceftriaxone 1g qd for 5-7 days   -f/u urine culture; no growth - will transition to PO cefpodoxime on DC

## 2020-05-20 NOTE — CONSULT NOTE ADULT - ASSESSMENT
Patient is a 89 y o BLack male with pmh of CKD 2 ( cr @ 1.08 with GFR 68 in 10/2019), HTN, GERD, who was admitted for hypoglycemia.   NEphrology consult placed in regards to Krishan and acidosis.       KRISHAN on CKD 2  last cr in 10/19 @ 1.08  DDX: prerenal given improvement with IVF as well as post renal given retention   given anemia on presentation, should consider MYeloma related Kidney disease--intrinsic--however, given patient's age, will not pursue dx  UA noted==> Hematuria-- Ck noted-- please, recheck UA  recommend giving 1 more liter of IVF  d5 w with 150 meq of sodium bicarbonate 150 meq @ 70 cc/hr  renal diet  please frequent bladder scan q 6hrs, if pt continues to retain, rec stroud cath and urology consult  hold ACEi/ARB/ NSAIDs    acidosis  improving   s/p bicarbonate drip and pushes  VBG noted   DDX; CKD   there is no osmolar gap  lactate noted  check urine na, chloride, K to calculate urine anion gap  for completeness, recommend checking acetaminophen level, salicylate level as well as BOHB given report of decreased po intake  meanwhile, continue with 1 L of D5 W 150 meq of sodium bicarbonate @ 70 cc /hr x 1 l  also, add sodium bicarbonate 650 mg po tid    renal bone disease  calcium, phos noted  check pth, vit D        anemia  iron studies noted-- no need for venofer  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

## 2020-05-20 NOTE — PROGRESS NOTE ADULT - PROBLEM SELECTOR PLAN 8
detailed exam -DVT: SCD  -GI: pantoprazole (home med)    1) PCP Contacted on Admission: (Y/N) --> No. Name & Phone #:  2) Date of Contact with PCP:  3) PCP Contacted at Discharge: (Y/N)  4) Summary of Handoff Given to PCP:   5) Post-Discharge Appointment Date and Location:

## 2020-05-20 NOTE — CONSULT NOTE ADULT - ASSESSMENT
89M with h/o HTN, GERD, CKD stage 3, BPH presenting with generalized weakness, decreased PO intake and lethargy s/p bactrim tx for UTI. Patient had high PVRs during hospital stay.    -c/w flomax 0.8mg  -Start finasteride 5mg  -c/w post void residual measurements and record in flowsheets  -if PVR>250cc and/or uncomfortable, can place stroud catheter and followup with Dr. Peck   -kassie per primary team

## 2020-05-20 NOTE — PROGRESS NOTE ADULT - ASSESSMENT
per Internal Medicine    89M with h/o HTN, GERD, CKD stage 3 admitted for RASHAWN on CKD stage iii and UTI.     Problem/Plan - 1:  ·  Problem: Acute cystitis with hematuria.  Plan: -positive UA in the outpatient setting as well as inpatient  -s/p 7 days of Bactrim DS BID as outpatient   -will continue with ceftriaxone 1g qd for 5-7 days   -f/u urine culture; no growth - will transition to PO cefpodoxime on DC.     Problem/Plan - 2:  ·  Problem: RASHAWN (acute kidney injury).  Plan: - fena 1.1%, indicating intrinsic etiology; could be 2/2 to use of bactrim   -f/u renal ultrasound  -S/p 1LNS and 1U pRBC     #Urinary retention  - f/u serial bladder scans  - will need stroud catheter if continues to retain; urology aware.     Problem/Plan - 3:  ·  Problem: Anemia, unspecified type.  Plan: Likely 2/t CKD/AOCD  -no signs of GI bleed, rectal exam with brown stool in ED  -s/p 1u pRBC (5/18)   -no iron studies performed prior to transfusion, will need further work-up  -monitor catrachita drain for signs of bleeding  -f/u iron studies, holding off SPEP, UPEP, immunofixation  -AM cbc with hgb of 9.5.     Problem/Plan - 4:  ·  Problem: Metabolic acidosis, normal anion gap (NAG).  Plan: -likely 2/2 RASHAWN vs RTA   -s/p 2 amps of bicarb and then D5W 150 meq bicarb at 50cc overnight.  -bicarb now at 16; starting 650 bicarb tabs TID  - additional d5w w/ 150 meq bicarb today    - renal consulted; sending urine lytes and beta hydroxybutyrate, salicylate and acetaminophen levels  - f/u further renal recs.     Problem/Plan - 5:  ·  Problem: Essential hypertension.  Plan: -takes Norvasc 5 and losartan 50 at home  -currently normotensive, restart home meds PRN.     Problem/Plan - 6:  Problem: Benign prostatic hyperplasia, unspecified whether lower urinary tract symptoms present. Plan: -c/w Flomax 0.4mg bid (home med).    Problem/Plan - 7:  ·  Problem: First degree AV block.  Plan: -EKG with NSR 75 bpm, 1st degree AV block, QTc 406. Known 1st AV block.   -monitor HR, avoid AV itzel blocking agents.     Problem/Plan - 8:  ·  Problem: Prophylactic measure.  Plan: -DVT: SCD  -GI: pantoprazole (home med)

## 2020-05-20 NOTE — CONSULT NOTE ADULT - SUBJECTIVE AND OBJECTIVE BOX
89M with h/o HTN, GERD, CKD stage 3, BPH presenting with generalized weakness, decreased PO intake and lethargy for past 3-4 days. Patient lives alone, his son and step-daughter checks upon him daily. He was noted to be lethargic, mostly staying in his bed with decreased PO intake. He reports having urinary urgency during this time. He went to see his urologist last week. He received a call from his urologist on Monday stating he has UTI. He took bactrim for one day. Subsequently he was sent to the ED. Denies melena or hematochezia. Off note, patient was admitted last year for acute cholecystitis and E coli bacteremia. He had perc catrachita with IR, treated with ceftriaxone and flagyl.      addendum: Above history noted. Urology consulted for urinary retention resulting in multiple straight catheterizations. Currently taking flomax 0.8mg. Denies f/c/hematuria/dysuria.    Vital Signs Last 24 Hrs  T(C): 36.6 (20 May 2020 12:06), Max: 36.6 (20 May 2020 12:06)  T(F): 97.8 (20 May 2020 12:06), Max: 97.8 (20 May 2020 12:06)  HR: 99 (20 May 2020 12:06) (80 - 102)  BP: 107/65 (20 May 2020 12:06) (107/65 - 121/71)  BP(mean): --  RR: 18 (20 May 2020 12:06) (18 - 18)  SpO2: 99% (20 May 2020 12:06) (99% - 100%)    Gen: NAD, sitting in chair   Pulm: Unlabored breathing  Abd: sNTND, no CVAT or SPT                          9.5    7.03  )-----------( 125      ( 20 May 2020 06:08 )             28.4   20 May 2020 06:08    134    |  109    |  28     ----------------------------<  84     4.7     |  16     |  2.05     Ca    8.5        20 May 2020 06:08  Phos  3.1       20 May 2020 06:08  Mg     1.8       20 May 2020 06:08    TPro  6.2    /  Alb  3.0    /  TBili  <0.2   /  DBili  x      /  AST  17     /  ALT  6      /  AlkPhos  57     18 May 2020 15:26  PT/INR - ( 18 May 2020 15:26 )   PT: 12.6 sec;   INR: 1.10          PTT - ( 18 May 2020 15:26 )  PTT:30.3 sec

## 2020-05-20 NOTE — PROGRESS NOTE ADULT - SUBJECTIVE AND OBJECTIVE BOX
Physical Medicine and Rehabilitation Progress Note:    Patient is a 89y old  Male who presents with a chief complaint of Lethargy, decreased PO intake (20 May 2020 11:01)      HPI:  89M with h/o HTN, GERD, CKD stage 3, BPH presenting with generalized weakness, decreased PO intake and lethargy for past 3-4 days. Patient lives alone, his son and step-daughter checks upon him daily. He was noted to be lethargic, mostly staying in his bed with decreased PO intake. He reports having urinary urgency during this time. He went to see his urologist last week. He received a call from his urologist on Monday stating he has UTI. He took bactrim for one day. Subsequently he was sent to the ED. Denies melena or hematochezia. Off note, patient was admitted last year for acute cholecystitis and E coli bacteremia. He had perc catrachita with IR, treated with ceftriaxone and flagyl.     EMS reports fingerstick of 36, gave an amp of dextrose. In the ED, vitals T96.3, P73, 105/52, R18, 98%RA. He received 1u pRBC, 1L NS, 2 amps dextrose and albuterol. Bettencourt catheter placed, drained about 200-300cc. He was admitted to [facility] for further care.     ROS: Urinary urgency resolved. No fever, chills, SOB, chest pain, palpitations, abdominal pain, n/v or diarrhea. (18 May 2020 19:24)                            9.5    7.03  )-----------( 125      ( 20 May 2020 06:08 )             28.4       05-20    134<L>  |  109<H>  |  28<H>  ----------------------------<  84  4.7   |  16<L>  |  2.05<H>    Ca    8.5      20 May 2020 06:08  Phos  3.1     05-20  Mg     1.8     05-20    TPro  6.2  /  Alb  3.0<L>  /  TBili  <0.2  /  DBili  x   /  AST  17  /  ALT  6<L>  /  AlkPhos  57  05-18    Vital Signs Last 24 Hrs  T(C): 36.6 (20 May 2020 12:06), Max: 36.6 (20 May 2020 12:06)  T(F): 97.8 (20 May 2020 12:06), Max: 97.8 (20 May 2020 12:06)  HR: 99 (20 May 2020 12:06) (80 - 102)  BP: 107/65 (20 May 2020 12:06) (107/65 - 121/71)  BP(mean): --  RR: 18 (20 May 2020 12:06) (18 - 18)  SpO2: 99% (20 May 2020 12:06) (99% - 100%)    MEDICATIONS  (STANDING):  cefTRIAXone   IVPB 1000 milliGRAM(s) IV Intermittent every 24 hours  dextrose 5% 1000 milliLiter(s) (50 mL/Hr) IV Continuous <Continuous>  pantoprazole    Tablet 40 milliGRAM(s) Oral before breakfast  sodium bicarbonate 650 milliGRAM(s) Oral every 8 hours  tamsulosin 0.4 milliGRAM(s) Oral two times a day    MEDICATIONS  (PRN):    Currently Undergoing Physical Therapy at bedside.    Functional Status Assessment:      Therapeutic Interventions      Bed Mobility  Bed Mobility Training Sit-to-Supine: independent  Bed Mobility Training Supine-to-Sit: independent    Sit-Stand Transfer Training  Transfer Training Sit-to-Stand Transfer: contact guard;  verbal cues;  1 person assist;  full weight-bearing   rolling walker  Transfer Training Stand-to-Sit Transfer: contact guard;  verbal cues;  1 person assist;  full weight-bearing   rolling walker  Sit-to-Stand Transfer Training Transfer Safety Analysis: decreased balance;  impaired balance;  cognitive, decreased safety awareness;  rolling walker    Gait Training  Gait Training: contact guard;  verbal cues;  1 person assist;  full weight-bearing   rolling walker;  100 feet  Gait Analysis: 2-point gait   decreased jean paul;  increased time in double stance;  impaired balance;  100 feet;  rolling walker  Gait Number of Times:: x 1    Therapeutic Exercise  Therapeutic Exercise Detail: LAQs, seated marching x 10             PM&R Impression: as above    Current Disposition Plan:  d/c home, home physical therapy, home care services

## 2020-05-20 NOTE — PROGRESS NOTE ADULT - PROBLEM SELECTOR PLAN 3
Likely 2/t CKD/AOCD  -no signs of GI bleed, rectal exam with brown stool in ED  -s/p 1u pRBC (5/18)   -no iron studies performed prior to transfusion, will need further work-up  -monitor catrachita drain for signs of bleeding  -f/u iron studies, holding off SPEP, UPEP, immunofixation  -AM cbc with hgb of 9.5.

## 2020-05-20 NOTE — CONSULT NOTE ADULT - SUBJECTIVE AND OBJECTIVE BOX
Renal consult  Patient is a 89 y o BLack male with pmh of CKD 2 ( cr @ 1.08 with GFR 68 in 10/2019), HTN, GERD, who was admitted for hypoglycemia.   NEphrology consult placed in regards to Krishan and acidosis.   on admission, Na @ 128, K @ 5.7-->> am cortisol@ 5.7  lactate @ 2  Serum osm @ 287--> osmolar gap 5  ED, vitals T96.3, P73, 105/52, R18, 98%RA  Patient is not reliable for History gathering.   Per H/P-- there is report of decreased po intake and pat was on Bactrim for one day.   UA noted--> hematuria, CK  38  Also, Pat has been getting intermittent catheterization in light of urine retention.       PAST MEDICAL & SURGICAL HISTORY:  BPH (benign prostatic hyperplasia)  GERD (gastroesophageal reflux disease)  HTN (hypertension)  H/O surgical procedure: percutaneous cholecystostomy        Allergies and Intolerances:        Allergies:  	No Known Allergies:     Home Medications:   * Patient Currently Takes Medications as of 18-May-2020 19:27 documented in Structured Notes  · 	Flomax 0.4 mg oral capsule: 1 cap(s) orally 2 times a day  · 	Norvasc 5 mg oral tablet: 1 tab(s) orally once a day  · 	losartan 50 mg oral tablet: 1 tab(s) orally once a day  · 	omeprazole 20 mg oral delayed release capsule: 1 cap(s) orally once a day    .      FAMILY HISTORY:  Family history of essential hypertension      SOCIAL HISTORY: Lives alone. Denies drinking, smoking or recreational drug use.    MEDICATIONS  (STANDING):  cefTRIAXone   IVPB 1000 milliGRAM(s) IV Intermittent every 24 hours  dextrose 5% 1000 milliLiter(s) (50 mL/Hr) IV Continuous <Continuous>  pantoprazole    Tablet 40 milliGRAM(s) Oral before breakfast  sodium bicarbonate 650 milliGRAM(s) Oral every 8 hours  tamsulosin 0.4 milliGRAM(s) Oral two times a day    MEDICATIONS  (PRN):      REVIEW OF SYSTEMS:    CONSTITUTIONAL: No fever or chills, No fatigue or tiredness.  EYES: No blurred or double vision.  ENT: No recent URI or sore throat  RESPIRATORY: No shortness of breath, cough, hemoptysis  CARDIOVASCULAR: No Chest pain or shortness of breath  GASTROINTESTINAL: NO abdominal or flank pain, No nausea or vomiting, No diarrhea  GENITOURINARY: No dysuria or urinary burning, No difficulty passing urine, No hematuria  NEUROLOGICAL: No headaches or blurred vision  SKIN: No skin rashes   MUSCULOSKELETAL: No arthralgia, Joint pain, leg edema, No muscle pains        PHYSICAL EXAM: T 97.7, bp 118/72, , o2 sat  99 % RA  GENERAL: NAD  HEAD:  Atraumatic, Normocephalic,   EYES: Bilateral conjunctiva and sclera normal,  Oral cavity: Oral mucosa dry and pink  NECK: Neck supple, No JVD  CHEST/LUNG: Clear to auscultation bilaterally; No wheeze, no rales, no crepitations  HEART: Regular rate and rhythm. TANIA II/VI at LPSB, No gallop, no rub   ABDOMEN: Soft, Nontender, BS+nt, No flank tenderness.   EXTREMITIES: No clubbing, cyanosis, or edema, no calf tenderness  Neurology: AAOx3, no focal neurological deficit  SKIN: No rashes or lesions      CAPILLARY BLOOD GLUCOSE      POCT Blood Glucose.: 105 mg/dL (20 May 2020 09:14)  POCT Blood Glucose.: 79 mg/dL (20 May 2020 06:11)  POCT Blood Glucose.: 92 mg/dL (19 May 2020 23:28)  POCT Blood Glucose.: 116 mg/dL (19 May 2020 17:08)  POCT Blood Glucose.: 89 mg/dL (19 May 2020 12:10)      I&O's Summary    19 May 2020 07:01  -  20 May 2020 07:00  --------------------------------------------------------  IN: 878 mL / OUT: 1450 mL / NET: -572 mL          LABS:                            9.5    7.03  )-----------( 125      ( 20 May 2020 06:08 )             28.4       PT/INR - ( 18 May 2020 15:26 )   PT: 12.6 sec;   INR: 1.10          PTT - ( 18 May 2020 15:26 )  PTT:30.3 sec  CARDIAC MARKERS ( 18 May 2020 15:26 )  x     / x     / 37 U/L / x     / x          Urinalysis Basic - ( 18 May 2020 16:27 )    Color: Yellow / Appearance: Clear / S.025 / pH: x  Gluc: x / Ketone: NEGATIVE  / Bili: Negative / Urobili: 0.2 E.U./dL   Blood: x / Protein: NEGATIVE mg/dL / Nitrite: NEGATIVE   Leuk Esterase: Small / RBC: Many /HPF / WBC 5-10 /HPF   Sq Epi: x / Non Sq Epi: 0-5 /HPF / Bacteria: Present /HPF        RADIOLOGY & ADDITIONAL TESTS:    EKG/Telemetry: Reviewed

## 2020-05-20 NOTE — PROGRESS NOTE ADULT - PROBLEM SELECTOR PLAN 2
- fena 1.1%, indicating intrinsic etiology; could be 2/2 to use of bactrim   -f/u renal ultrasound  -S/p 1LNS and 1U pRBC     #Urinary retention  - f/u serial bladder scans  - will need stroud catheter if continues to retain; urology aware

## 2020-05-20 NOTE — PROGRESS NOTE ADULT - SUBJECTIVE AND OBJECTIVE BOX
OVERNIGHT: No overnight events.  SUBJECTIVE: Patient seen and examined at bedside. NAD, resting comfortably in bed. No chest pain, abdominal pain, fevers, chills.     OBJECTIVE:  PHYSICAL EXAM:  General: NAD, comfortable  HEENT: NCAT, PERRL, clear conjunctiva, no scleral icterus  Neck: supple, no JVD  Respiratory: CTA b/l, no wheezing, rhonchi, rales  Cardiovascular: RRR, normal S1S2, no M/R/G  Vascular: 2+ radial and DP pulses  Abdomen: soft, NT/ND, bowel sounds in all four quadrants, no palpable masses; drain in place in RUQ  Extremities: WWP, no clubbing, cyanosis, or edema  Skin: No rashes present  Neuro: A&Ox3, moves limbs spontaneously    VITAL SIGNS:  Vital Signs Last 24 Hrs  T(C): 36.5 (20 May 2020 05:41), Max: 36.5 (20 May 2020 05:41)  T(F): 97.7 (20 May 2020 05:41), Max: 97.7 (20 May 2020 05:41)  HR: 102 (20 May 2020 05:41) (80 - 102)  BP: 118/72 (20 May 2020 05:41) (118/72 - 121/71)  BP(mean): --  RR: 18 (20 May 2020 05:41) (18 - 18)  SpO2: 99% (20 May 2020 05:41) (99% - 100%)      MEDICATIONS:  MEDICATIONS  (STANDING):  cefTRIAXone   IVPB 1000 milliGRAM(s) IV Intermittent every 24 hours  dextrose 5% 1000 milliLiter(s) (50 mL/Hr) IV Continuous <Continuous>  pantoprazole    Tablet 40 milliGRAM(s) Oral before breakfast  sodium bicarbonate 650 milliGRAM(s) Oral every 8 hours  tamsulosin 0.4 milliGRAM(s) Oral two times a day    MEDICATIONS  (PRN):      ALLERGIES:  Allergies    No Known Allergies    Intolerances        LABS:                        9.5    7.03  )-----------( 125      ( 20 May 2020 06:08 )             28.4     05-20    134<L>  |  109<H>  |  28<H>  ----------------------------<  84  4.7   |  16<L>  |  2.05<H>    Ca    8.5      20 May 2020 06:08  Phos  3.1     05-20  Mg     1.8     -20    TPro  6.2  /  Alb  3.0<L>  /  TBili  <0.2  /  DBili  x   /  AST  17  /  ALT  6<L>  /  AlkPhos  57  05-18    PT/INR - ( 18 May 2020 15:26 )   PT: 12.6 sec;   INR: 1.10          PTT - ( 18 May 2020 15:26 )  PTT:30.3 sec  Urinalysis Basic - ( 18 May 2020 16:27 )    Color: Yellow / Appearance: Clear / S.025 / pH: x  Gluc: x / Ketone: NEGATIVE  / Bili: Negative / Urobili: 0.2 E.U./dL   Blood: x / Protein: NEGATIVE mg/dL / Nitrite: NEGATIVE   Leuk Esterase: Small / RBC: Many /HPF / WBC 5-10 /HPF   Sq Epi: x / Non Sq Epi: 0-5 /HPF / Bacteria: Present /HPF      CAPILLARY BLOOD GLUCOSE      POCT Blood Glucose.: 105 mg/dL (20 May 2020 09:14)      RADIOLOGY & ADDITIONAL TESTS: Reviewed.

## 2020-05-20 NOTE — PROGRESS NOTE ADULT - PROBLEM SELECTOR PLAN 4
-likely 2/2 RASHAWN vs RTA   -s/p 2 amps of bicarb and then D5W 150 meq bicarb at 50cc overnight.  -bicarb now at 16; starting 650 bicarb tabs TID  - additional d5w w/ 150 meq bicarb today    - renal consulted; sending urine lytes and beta hydroxybutyrate, salicylate and acetaminophen levels  - f/u further renal recs

## 2020-05-21 VITALS
RESPIRATION RATE: 20 BRPM | HEART RATE: 97 BPM | TEMPERATURE: 97 F | DIASTOLIC BLOOD PRESSURE: 74 MMHG | SYSTOLIC BLOOD PRESSURE: 144 MMHG | OXYGEN SATURATION: 95 %

## 2020-05-21 DIAGNOSIS — E87.2 ACIDOSIS: ICD-10-CM

## 2020-05-21 DIAGNOSIS — R31.9 HEMATURIA, UNSPECIFIED: ICD-10-CM

## 2020-05-21 LAB
ANION GAP SERPL CALC-SCNC: 10 MMOL/L — SIGNIFICANT CHANGE UP (ref 5–17)
BASOPHILS # BLD AUTO: 0.03 K/UL — SIGNIFICANT CHANGE UP (ref 0–0.2)
BASOPHILS NFR BLD AUTO: 0.4 % — SIGNIFICANT CHANGE UP (ref 0–2)
BUN SERPL-MCNC: 22 MG/DL — SIGNIFICANT CHANGE UP (ref 7–23)
CALCIUM SERPL-MCNC: 8.6 MG/DL — SIGNIFICANT CHANGE UP (ref 8.4–10.5)
CHLORIDE SERPL-SCNC: 107 MMOL/L — SIGNIFICANT CHANGE UP (ref 96–108)
CO2 SERPL-SCNC: 20 MMOL/L — LOW (ref 22–31)
CREAT SERPL-MCNC: 1.77 MG/DL — HIGH (ref 0.5–1.3)
EOSINOPHIL # BLD AUTO: 0.39 K/UL — SIGNIFICANT CHANGE UP (ref 0–0.5)
EOSINOPHIL NFR BLD AUTO: 5.2 % — SIGNIFICANT CHANGE UP (ref 0–6)
GLUCOSE BLDC GLUCOMTR-MCNC: 85 MG/DL — SIGNIFICANT CHANGE UP (ref 70–99)
GLUCOSE SERPL-MCNC: 91 MG/DL — SIGNIFICANT CHANGE UP (ref 70–99)
HCT VFR BLD CALC: 27.2 % — LOW (ref 39–50)
HGB BLD-MCNC: 9 G/DL — LOW (ref 13–17)
IMM GRANULOCYTES NFR BLD AUTO: 0.7 % — SIGNIFICANT CHANGE UP (ref 0–1.5)
LYMPHOCYTES # BLD AUTO: 2.06 K/UL — SIGNIFICANT CHANGE UP (ref 1–3.3)
LYMPHOCYTES # BLD AUTO: 27.6 % — SIGNIFICANT CHANGE UP (ref 13–44)
MAGNESIUM SERPL-MCNC: 1.7 MG/DL — SIGNIFICANT CHANGE UP (ref 1.6–2.6)
MCHC RBC-ENTMCNC: 28.4 PG — SIGNIFICANT CHANGE UP (ref 27–34)
MCHC RBC-ENTMCNC: 33.1 GM/DL — SIGNIFICANT CHANGE UP (ref 32–36)
MCV RBC AUTO: 85.8 FL — SIGNIFICANT CHANGE UP (ref 80–100)
MONOCYTES # BLD AUTO: 0.75 K/UL — SIGNIFICANT CHANGE UP (ref 0–0.9)
MONOCYTES NFR BLD AUTO: 10 % — SIGNIFICANT CHANGE UP (ref 2–14)
NEUTROPHILS # BLD AUTO: 4.19 K/UL — SIGNIFICANT CHANGE UP (ref 1.8–7.4)
NEUTROPHILS NFR BLD AUTO: 56.1 % — SIGNIFICANT CHANGE UP (ref 43–77)
NRBC # BLD: 0 /100 WBCS — SIGNIFICANT CHANGE UP (ref 0–0)
PHOSPHATE SERPL-MCNC: 2.7 MG/DL — SIGNIFICANT CHANGE UP (ref 2.5–4.5)
PLATELET # BLD AUTO: 135 K/UL — LOW (ref 150–400)
POTASSIUM SERPL-MCNC: 5 MMOL/L — SIGNIFICANT CHANGE UP (ref 3.5–5.3)
POTASSIUM SERPL-SCNC: 5 MMOL/L — SIGNIFICANT CHANGE UP (ref 3.5–5.3)
RBC # BLD: 3.17 M/UL — LOW (ref 4.2–5.8)
RBC # FLD: 16 % — HIGH (ref 10.3–14.5)
SODIUM SERPL-SCNC: 137 MMOL/L — SIGNIFICANT CHANGE UP (ref 135–145)
WBC # BLD: 7.47 K/UL — SIGNIFICANT CHANGE UP (ref 3.8–10.5)
WBC # FLD AUTO: 7.47 K/UL — SIGNIFICANT CHANGE UP (ref 3.8–10.5)

## 2020-05-21 PROCEDURE — 76775 US EXAM ABDO BACK WALL LIM: CPT

## 2020-05-21 PROCEDURE — 84132 ASSAY OF SERUM POTASSIUM: CPT

## 2020-05-21 PROCEDURE — 87040 BLOOD CULTURE FOR BACTERIA: CPT

## 2020-05-21 PROCEDURE — 86901 BLOOD TYPING SEROLOGIC RH(D): CPT

## 2020-05-21 PROCEDURE — 84466 ASSAY OF TRANSFERRIN: CPT

## 2020-05-21 PROCEDURE — 82803 BLOOD GASES ANY COMBINATION: CPT

## 2020-05-21 PROCEDURE — 87635 SARS-COV-2 COVID-19 AMP PRB: CPT

## 2020-05-21 PROCEDURE — 97161 PT EVAL LOW COMPLEX 20 MIN: CPT

## 2020-05-21 PROCEDURE — 80307 DRUG TEST PRSMV CHEM ANLYZR: CPT

## 2020-05-21 PROCEDURE — 96376 TX/PRO/DX INJ SAME DRUG ADON: CPT | Mod: XU

## 2020-05-21 PROCEDURE — 71045 X-RAY EXAM CHEST 1 VIEW: CPT

## 2020-05-21 PROCEDURE — 83540 ASSAY OF IRON: CPT

## 2020-05-21 PROCEDURE — 82570 ASSAY OF URINE CREATININE: CPT

## 2020-05-21 PROCEDURE — 80048 BASIC METABOLIC PNL TOTAL CA: CPT

## 2020-05-21 PROCEDURE — 84100 ASSAY OF PHOSPHORUS: CPT

## 2020-05-21 PROCEDURE — 83605 ASSAY OF LACTIC ACID: CPT

## 2020-05-21 PROCEDURE — 82728 ASSAY OF FERRITIN: CPT

## 2020-05-21 PROCEDURE — 84295 ASSAY OF SERUM SODIUM: CPT

## 2020-05-21 PROCEDURE — 85027 COMPLETE CBC AUTOMATED: CPT

## 2020-05-21 PROCEDURE — 82010 KETONE BODYS QUAN: CPT

## 2020-05-21 PROCEDURE — 87086 URINE CULTURE/COLONY COUNT: CPT

## 2020-05-21 PROCEDURE — 36430 TRANSFUSION BLD/BLD COMPNT: CPT

## 2020-05-21 PROCEDURE — 86850 RBC ANTIBODY SCREEN: CPT

## 2020-05-21 PROCEDURE — 51702 INSERT TEMP BLADDER CATH: CPT

## 2020-05-21 PROCEDURE — 82330 ASSAY OF CALCIUM: CPT

## 2020-05-21 PROCEDURE — 85045 AUTOMATED RETICULOCYTE COUNT: CPT

## 2020-05-21 PROCEDURE — 82533 TOTAL CORTISOL: CPT

## 2020-05-21 PROCEDURE — 84443 ASSAY THYROID STIM HORMONE: CPT

## 2020-05-21 PROCEDURE — 83935 ASSAY OF URINE OSMOLALITY: CPT

## 2020-05-21 PROCEDURE — 84540 ASSAY OF URINE/UREA-N: CPT

## 2020-05-21 PROCEDURE — 99232 SBSQ HOSP IP/OBS MODERATE 35: CPT

## 2020-05-21 PROCEDURE — 84300 ASSAY OF URINE SODIUM: CPT

## 2020-05-21 PROCEDURE — 93005 ELECTROCARDIOGRAM TRACING: CPT

## 2020-05-21 PROCEDURE — 85610 PROTHROMBIN TIME: CPT

## 2020-05-21 PROCEDURE — 80053 COMPREHEN METABOLIC PANEL: CPT

## 2020-05-21 PROCEDURE — 83735 ASSAY OF MAGNESIUM: CPT

## 2020-05-21 PROCEDURE — 85730 THROMBOPLASTIN TIME PARTIAL: CPT

## 2020-05-21 PROCEDURE — P9016: CPT

## 2020-05-21 PROCEDURE — 82436 ASSAY OF URINE CHLORIDE: CPT

## 2020-05-21 PROCEDURE — 85025 COMPLETE CBC W/AUTO DIFF WBC: CPT

## 2020-05-21 PROCEDURE — 97116 GAIT TRAINING THERAPY: CPT

## 2020-05-21 PROCEDURE — 99239 HOSP IP/OBS DSCHRG MGMT >30: CPT

## 2020-05-21 PROCEDURE — 36415 COLL VENOUS BLD VENIPUNCTURE: CPT

## 2020-05-21 PROCEDURE — 83550 IRON BINDING TEST: CPT

## 2020-05-21 PROCEDURE — 82962 GLUCOSE BLOOD TEST: CPT

## 2020-05-21 PROCEDURE — U0003: CPT

## 2020-05-21 PROCEDURE — 99285 EMERGENCY DEPT VISIT HI MDM: CPT | Mod: 25

## 2020-05-21 PROCEDURE — 83930 ASSAY OF BLOOD OSMOLALITY: CPT

## 2020-05-21 PROCEDURE — 86923 COMPATIBILITY TEST ELECTRIC: CPT

## 2020-05-21 PROCEDURE — 96374 THER/PROPH/DIAG INJ IV PUSH: CPT | Mod: XU

## 2020-05-21 PROCEDURE — 82550 ASSAY OF CK (CPK): CPT

## 2020-05-21 PROCEDURE — 84133 ASSAY OF URINE POTASSIUM: CPT

## 2020-05-21 PROCEDURE — 81001 URINALYSIS AUTO W/SCOPE: CPT

## 2020-05-21 PROCEDURE — 94640 AIRWAY INHALATION TREATMENT: CPT

## 2020-05-21 PROCEDURE — 83010 ASSAY OF HAPTOGLOBIN QUANT: CPT

## 2020-05-21 PROCEDURE — 83615 LACTATE (LD) (LDH) ENZYME: CPT

## 2020-05-21 RX ORDER — FINASTERIDE 5 MG/1
1 TABLET, FILM COATED ORAL
Qty: 30 | Refills: 0
Start: 2020-05-21 | End: 2020-06-19

## 2020-05-21 RX ORDER — CEFPODOXIME PROXETIL 100 MG
1 TABLET ORAL
Qty: 5 | Refills: 0
Start: 2020-05-21 | End: 2020-05-25

## 2020-05-21 RX ADMIN — Medication 650 MILLIGRAM(S): at 06:23

## 2020-05-21 RX ADMIN — PANTOPRAZOLE SODIUM 40 MILLIGRAM(S): 20 TABLET, DELAYED RELEASE ORAL at 06:23

## 2020-05-21 RX ADMIN — Medication 650 MILLIGRAM(S): at 13:10

## 2020-05-21 RX ADMIN — FINASTERIDE 5 MILLIGRAM(S): 5 TABLET, FILM COATED ORAL at 13:10

## 2020-05-21 NOTE — PROGRESS NOTE ADULT - ATTENDING COMMENTS
RASHAWN with component of prerenal and post renal azotemia, hyperkalemia and metabolic acidosis  good response to fluids, bicarb   modifying therapy as above
89 year old with HTN, CKD stage 3 and GERD admitted for UTI and RASHAWN on CKD. On exam, he is AAOx2-3. Cholecystomy drain noted, placed by IR in Oct 2019. Hospital course c/b non-AG acidosis which is improving. Patient was noted to have low bicarbonate levels in Oct 2019. +urinary retention requiring straight cath.     -Urology consulted, will f/u recs  -Nephrology consult for evaluation of non-AG acidosis; ?RTA versus loss of bicarb through cholecystostomy drain  -D/w IR about removal of drain  -C/w CTX, can transition to Cefpodoxime upon discharge for UTI
Suspect worsening of CKD is due to bactrim use, last dose was yesterday, RASHAWN on CKD stage 3 already improving, c/w IVF's for now with sodium bicarbonate given acidosis, once off the drip will start PO sodium bicarbonate (to be continued upon discharge home)  C/w ceftriaxone for now for UTI, UA minimally abnormal (pt was on bactrim for about one week), will c/w Abx for 5-7 days total (cefpodoxime on dc)  Metabolic encephalopathy due to UTI and RASHAWN already improving  Hyperkalemia and hyponatremia are improving  Anemia of chronic disease, responded to transfusion  PT --> SKYLAR Vs HPT (family wants to take patient home)  Mild protein calorie malnutrition  Rest as above

## 2020-05-21 NOTE — PROGRESS NOTE ADULT - PROBLEM SELECTOR PLAN 2
persistent hematuria on repeated UA  urine spun, with minimal RBC/hpf without any cellular casts or dysmorphic RBCs, mostly showing WBS and WBS clumps consistent with UTI dx, no further work up required at this time.

## 2020-05-21 NOTE — PROGRESS NOTE ADULT - PROBLEM SELECTOR PLAN 1
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

## 2020-05-21 NOTE — PROGRESS NOTE ADULT - SUBJECTIVE AND OBJECTIVE BOX
O/N Events:  KATHRYN, HDS and Afeb  Subjective:  alert and awake, communicative, pleasantly confused, vitals and labs reviewed with improving Renal Fx and acidosis  UOP decent, volume status acceptable      VITALS  Vital Signs Last 24 Hrs  T(C): 36.5 (21 May 2020 13:00), Max: 36.9 (21 May 2020 04:31)  T(F): 97.7 (21 May 2020 13:00), Max: 98.4 (21 May 2020 04:31)  HR: 102 (21 May 2020 13:00) (85 - 102)  BP: 122/75 (21 May 2020 13:00) (117/75 - 144/73)  BP(mean): 91 (21 May 2020 13:00) (91 - 91)  RR: 18 (21 May 2020 13:00) (17 - 18)  SpO2: 99% (21 May 2020 13:00) (99% - 100%)    PHYSICAL EXAM  General: alert and awake, NAD  Eyes: PERRL; EOMI  Neck: no JVD  Respiratory: CTA B/L  Cardiovascular: Regular rhythm/rate; S1/S2  Gastrointestinal: Soft; NTND   Extremities: WWP; no edema  Neurological:  confused, not fully following commands, TITUS, appears non focal   Bettencourt: draining lyte urine     MEDICATIONS  (STANDING):  cefTRIAXone   IVPB 1000 milliGRAM(s) IV Intermittent every 24 hours  finasteride 5 milliGRAM(s) Oral daily  pantoprazole    Tablet 40 milliGRAM(s) Oral before breakfast  sodium bicarbonate 650 milliGRAM(s) Oral every 8 hours  tamsulosin 0.8 milliGRAM(s) Oral at bedtime    MEDICATIONS  (PRN):      LABS                        9.0    7.47  )-----------( 135      ( 21 May 2020 06:24 )             27.2     05-21    137  |  107  |  22  ----------------------------<  91  5.0   |  20<L>  |  1.77<H>    Ca    8.6      21 May 2020 06:24  Phos  2.7     05-21  Mg     1.7     05-21          Urinalysis Basic - ( 20 May 2020 17:50 )    Color: x / Appearance: x / SG: x / pH: x  Gluc: x / Ketone: x  / Bili: x / Urobili: x   Blood: x / Protein: x / Nitrite: x   Leuk Esterase: x / RBC: Many /HPF / WBC < 5 /HPF   Sq Epi: x / Non Sq Epi: 0-5 /HPF / Bacteria: Present /HPF

## 2020-05-21 NOTE — PROGRESS NOTE ADULT - ASSESSMENT
A/p  89 y/o AAM w/ PMHx of CKD 2/ HTN/ BPH and GERD, admitted for hypoglycemia, nephrology consulted for RASHAWN and NAGMA, RASHAWN attributed to mixed pic of prerenal etiologies with a component of urinary obstruction improved with volume resuscitation and Bettencourt placement, Acidosis also improved with bicarb supplementation

## 2020-05-26 PROBLEM — K21.9 GASTRO-ESOPHAGEAL REFLUX DISEASE WITHOUT ESOPHAGITIS: Chronic | Status: ACTIVE | Noted: 2020-05-18

## 2020-05-26 PROBLEM — N40.0 BENIGN PROSTATIC HYPERPLASIA WITHOUT LOWER URINARY TRACT SYMPTOMS: Chronic | Status: ACTIVE | Noted: 2020-05-18

## 2020-05-27 DIAGNOSIS — E87.5 HYPERKALEMIA: ICD-10-CM

## 2020-05-27 DIAGNOSIS — N17.9 ACUTE KIDNEY FAILURE, UNSPECIFIED: ICD-10-CM

## 2020-05-27 DIAGNOSIS — Z11.59 ENCOUNTER FOR SCREENING FOR OTHER VIRAL DISEASES: ICD-10-CM

## 2020-05-27 DIAGNOSIS — E87.2 ACIDOSIS: ICD-10-CM

## 2020-05-27 DIAGNOSIS — E44.1 MILD PROTEIN-CALORIE MALNUTRITION: ICD-10-CM

## 2020-05-27 DIAGNOSIS — I44.0 ATRIOVENTRICULAR BLOCK, FIRST DEGREE: ICD-10-CM

## 2020-05-27 DIAGNOSIS — E16.2 HYPOGLYCEMIA, UNSPECIFIED: ICD-10-CM

## 2020-05-27 DIAGNOSIS — N30.01 ACUTE CYSTITIS WITH HEMATURIA: ICD-10-CM

## 2020-05-27 DIAGNOSIS — E87.1 HYPO-OSMOLALITY AND HYPONATREMIA: ICD-10-CM

## 2020-05-27 DIAGNOSIS — N18.3 CHRONIC KIDNEY DISEASE, STAGE 3 (MODERATE): ICD-10-CM

## 2020-05-27 DIAGNOSIS — Z79.899 OTHER LONG TERM (CURRENT) DRUG THERAPY: ICD-10-CM

## 2020-05-27 DIAGNOSIS — G93.41 METABOLIC ENCEPHALOPATHY: ICD-10-CM

## 2020-05-27 DIAGNOSIS — I12.9 HYPERTENSIVE CHRONIC KIDNEY DISEASE WITH STAGE 1 THROUGH STAGE 4 CHRONIC KIDNEY DISEASE, OR UNSPECIFIED CHRONIC KIDNEY DISEASE: ICD-10-CM

## 2020-05-27 DIAGNOSIS — N40.1 BENIGN PROSTATIC HYPERPLASIA WITH LOWER URINARY TRACT SYMPTOMS: ICD-10-CM

## 2020-05-27 DIAGNOSIS — K21.9 GASTRO-ESOPHAGEAL REFLUX DISEASE WITHOUT ESOPHAGITIS: ICD-10-CM

## 2020-05-27 DIAGNOSIS — T36.8X5A ADVERSE EFFECT OF OTHER SYSTEMIC ANTIBIOTICS, INITIAL ENCOUNTER: ICD-10-CM

## 2020-05-27 DIAGNOSIS — R33.8 OTHER RETENTION OF URINE: ICD-10-CM

## 2020-05-27 DIAGNOSIS — D63.1 ANEMIA IN CHRONIC KIDNEY DISEASE: ICD-10-CM

## 2020-05-27 DIAGNOSIS — D63.8 ANEMIA IN OTHER CHRONIC DISEASES CLASSIFIED ELSEWHERE: ICD-10-CM

## 2020-05-28 ENCOUNTER — APPOINTMENT (OUTPATIENT)
Dept: UROLOGY | Facility: CLINIC | Age: 85
End: 2020-05-28
Payer: MEDICARE

## 2020-05-28 VITALS — TEMPERATURE: 98.9 F | HEART RATE: 89 BPM | SYSTOLIC BLOOD PRESSURE: 116 MMHG | DIASTOLIC BLOOD PRESSURE: 65 MMHG

## 2020-05-28 PROCEDURE — 99213 OFFICE O/P EST LOW 20 MIN: CPT

## 2020-05-28 RX ORDER — TAMSULOSIN HYDROCHLORIDE 0.4 MG/1
0.4 CAPSULE ORAL
Qty: 180 | Refills: 3 | Status: ACTIVE | COMMUNITY
Start: 2019-12-19 | End: 1900-01-01

## 2020-05-28 NOTE — HISTORY OF PRESENT ILLNESS
[FreeTextEntry1] : returns for follow up\par prev retention 12/2019\par recent hospital admission with UTI and recurrent retention\par on flomax 0.8 and finasteride 5\par has catrachita tube\par feels fine

## 2020-05-28 NOTE — ASSESSMENT
[FreeTextEntry1] : urinary retention\par repeat risk retention again reviewed\par will maintain stroud for now\par f/u 2 weeks for poss TOV

## 2020-06-11 ENCOUNTER — APPOINTMENT (OUTPATIENT)
Dept: UROLOGY | Facility: CLINIC | Age: 85
End: 2020-06-11
Payer: MEDICARE

## 2020-06-11 VITALS
HEIGHT: 60 IN | HEART RATE: 80 BPM | TEMPERATURE: 98.5 F | BODY MASS INDEX: 27.48 KG/M2 | WEIGHT: 140 LBS | SYSTOLIC BLOOD PRESSURE: 125 MMHG | OXYGEN SATURATION: 97 % | DIASTOLIC BLOOD PRESSURE: 71 MMHG

## 2020-06-11 PROCEDURE — 99213 OFFICE O/P EST LOW 20 MIN: CPT

## 2020-06-11 NOTE — ASSESSMENT
[FreeTextEntry1] : urinary retention\par reviewed options incluiding persistent stroud, cic and voiding trial\par requests stroud dc today\par given covid pandemic will pull stroud and send pt home - if fails to void to return to office this afternoon\par otherwise f/u 1 month

## 2020-06-11 NOTE — HISTORY OF PRESENT ILLNESS
[FreeTextEntry1] : returns for follow up with his wife\par increasing strength and now ambulatory\par bothered by stroud\par here to discuss

## 2020-06-12 ENCOUNTER — APPOINTMENT (OUTPATIENT)
Dept: UROLOGY | Facility: CLINIC | Age: 85
End: 2020-06-12
Payer: MEDICARE

## 2020-06-12 PROCEDURE — 51702 INSERT TEMP BLADDER CATH: CPT

## 2020-07-17 ENCOUNTER — APPOINTMENT (OUTPATIENT)
Dept: UROLOGY | Facility: CLINIC | Age: 85
End: 2020-07-17
Payer: MEDICARE

## 2020-07-17 VITALS
WEIGHT: 140 LBS | DIASTOLIC BLOOD PRESSURE: 69 MMHG | BODY MASS INDEX: 27.48 KG/M2 | TEMPERATURE: 97.6 F | HEART RATE: 96 BPM | SYSTOLIC BLOOD PRESSURE: 153 MMHG | HEIGHT: 60 IN

## 2020-07-17 PROCEDURE — 99213 OFFICE O/P EST LOW 20 MIN: CPT | Mod: 25

## 2020-07-17 PROCEDURE — 51702 INSERT TEMP BLADDER CATH: CPT

## 2020-07-21 NOTE — ASSESSMENT
[FreeTextEntry1] : Urinary Retention\par -discussed with patient need to keep catheter in place given retention history.\par -patient agreed and new catheter placed today without difficulty \par -continue Flomax and Finasteride\par \par \par RTC in 1 month

## 2020-07-21 NOTE — HISTORY OF PRESENT ILLNESS
[FreeTextEntry1] : 89 year old male with history of urinary retention\par Has been managing with catheter well for the past month\par Reports that he is getting stronger and feeling better after recent hospitalization\par \par Recently started Lasix for LE edema by PCP\par Has a catrachita tube in place\par

## 2020-07-21 NOTE — PHYSICAL EXAM
[General Appearance - Well Developed] : well developed [General Appearance - Well Nourished] : well nourished [Normal Appearance] : normal appearance [Well Groomed] : well groomed [General Appearance - In No Acute Distress] : no acute distress [Abdomen Soft] : soft [Abdomen Tenderness] : non-tender [Costovertebral Angle Tenderness] : no ~M costovertebral angle tenderness [Penis Abnormality] : normal uncircumcised penis [] : no respiratory distress [Respiration, Rhythm And Depth] : normal respiratory rhythm and effort [Exaggerated Use Of Accessory Muscles For Inspiration] : no accessory muscle use [Oriented To Time, Place, And Person] : oriented to person, place, and time [Affect] : the affect was normal [Mood] : the mood was normal [Not Anxious] : not anxious [FreeTextEntry1] : uses walker

## 2020-08-18 ENCOUNTER — APPOINTMENT (OUTPATIENT)
Dept: UROLOGY | Facility: CLINIC | Age: 85
End: 2020-08-18
Payer: MEDICARE

## 2020-08-18 VITALS — SYSTOLIC BLOOD PRESSURE: 173 MMHG | TEMPERATURE: 98.3 F | HEART RATE: 73 BPM | DIASTOLIC BLOOD PRESSURE: 73 MMHG

## 2020-08-18 PROCEDURE — 51702 INSERT TEMP BLADDER CATH: CPT

## 2020-08-27 ENCOUNTER — EMERGENCY (EMERGENCY)
Facility: HOSPITAL | Age: 85
LOS: 1 days | Discharge: HOME | End: 2020-08-27
Attending: EMERGENCY MEDICINE | Admitting: EMERGENCY MEDICINE
Payer: MEDICARE

## 2020-08-27 VITALS
RESPIRATION RATE: 18 BRPM | HEIGHT: 70 IN | HEART RATE: 63 BPM | WEIGHT: 184.97 LBS | TEMPERATURE: 98 F | OXYGEN SATURATION: 100 % | SYSTOLIC BLOOD PRESSURE: 158 MMHG | DIASTOLIC BLOOD PRESSURE: 82 MMHG

## 2020-08-27 VITALS
DIASTOLIC BLOOD PRESSURE: 70 MMHG | SYSTOLIC BLOOD PRESSURE: 174 MMHG | HEART RATE: 60 BPM | RESPIRATION RATE: 18 BRPM | OXYGEN SATURATION: 100 % | TEMPERATURE: 97 F

## 2020-08-27 DIAGNOSIS — Z98.890 OTHER SPECIFIED POSTPROCEDURAL STATES: Chronic | ICD-10-CM

## 2020-08-27 LAB
ALBUMIN SERPL ELPH-MCNC: 3.3 G/DL — SIGNIFICANT CHANGE UP (ref 3.3–5)
ALBUMIN SERPL ELPH-MCNC: 3.4 G/DL — SIGNIFICANT CHANGE UP (ref 3.3–5)
ALBUMIN SERPL ELPH-MCNC: 3.4 G/DL — SIGNIFICANT CHANGE UP (ref 3.3–5)
ALP SERPL-CCNC: 70 U/L — SIGNIFICANT CHANGE UP (ref 40–120)
ALP SERPL-CCNC: 71 U/L — SIGNIFICANT CHANGE UP (ref 40–120)
ALP SERPL-CCNC: 71 U/L — SIGNIFICANT CHANGE UP (ref 40–120)
ALT FLD-CCNC: 7 U/L — LOW (ref 10–45)
ALT FLD-CCNC: SIGNIFICANT CHANGE UP U/L (ref 10–45)
ALT FLD-CCNC: SIGNIFICANT CHANGE UP U/L (ref 10–45)
ANION GAP SERPL CALC-SCNC: 10 MMOL/L — SIGNIFICANT CHANGE UP (ref 5–17)
ANION GAP SERPL CALC-SCNC: 8 MMOL/L — SIGNIFICANT CHANGE UP (ref 5–17)
ANION GAP SERPL CALC-SCNC: 8 MMOL/L — SIGNIFICANT CHANGE UP (ref 5–17)
APTT BLD: 33.2 SEC — SIGNIFICANT CHANGE UP (ref 27.5–35.5)
AST SERPL-CCNC: 15 U/L — SIGNIFICANT CHANGE UP (ref 10–40)
AST SERPL-CCNC: SIGNIFICANT CHANGE UP U/L (ref 10–40)
AST SERPL-CCNC: SIGNIFICANT CHANGE UP U/L (ref 10–40)
BASOPHILS # BLD AUTO: 0.02 K/UL — SIGNIFICANT CHANGE UP (ref 0–0.2)
BASOPHILS NFR BLD AUTO: 0.3 % — SIGNIFICANT CHANGE UP (ref 0–2)
BILIRUB SERPL-MCNC: 0.2 MG/DL — SIGNIFICANT CHANGE UP (ref 0.2–1.2)
BLD GP AB SCN SERPL QL: NEGATIVE — SIGNIFICANT CHANGE UP
BUN SERPL-MCNC: 30 MG/DL — HIGH (ref 7–23)
CALCIUM SERPL-MCNC: 9.2 MG/DL — SIGNIFICANT CHANGE UP (ref 8.4–10.5)
CHLORIDE SERPL-SCNC: 106 MMOL/L — SIGNIFICANT CHANGE UP (ref 96–108)
CHLORIDE SERPL-SCNC: 107 MMOL/L — SIGNIFICANT CHANGE UP (ref 96–108)
CHLORIDE SERPL-SCNC: 107 MMOL/L — SIGNIFICANT CHANGE UP (ref 96–108)
CO2 SERPL-SCNC: 19 MMOL/L — LOW (ref 22–31)
CO2 SERPL-SCNC: 21 MMOL/L — LOW (ref 22–31)
CO2 SERPL-SCNC: 21 MMOL/L — LOW (ref 22–31)
CREAT SERPL-MCNC: 1.28 MG/DL — SIGNIFICANT CHANGE UP (ref 0.5–1.3)
CREAT SERPL-MCNC: 1.33 MG/DL — HIGH (ref 0.5–1.3)
CREAT SERPL-MCNC: 1.35 MG/DL — HIGH (ref 0.5–1.3)
EOSINOPHIL # BLD AUTO: 0.56 K/UL — HIGH (ref 0–0.5)
EOSINOPHIL NFR BLD AUTO: 9 % — HIGH (ref 0–6)
GLUCOSE SERPL-MCNC: 83 MG/DL — SIGNIFICANT CHANGE UP (ref 70–99)
GLUCOSE SERPL-MCNC: 83 MG/DL — SIGNIFICANT CHANGE UP (ref 70–99)
GLUCOSE SERPL-MCNC: 90 MG/DL — SIGNIFICANT CHANGE UP (ref 70–99)
HCT VFR BLD CALC: 32.7 % — LOW (ref 39–50)
HGB BLD-MCNC: 10.2 G/DL — LOW (ref 13–17)
IMM GRANULOCYTES NFR BLD AUTO: 0.5 % — SIGNIFICANT CHANGE UP (ref 0–1.5)
INR BLD: 1.06 — SIGNIFICANT CHANGE UP (ref 0.88–1.16)
LIDOCAIN IGE QN: 19 U/L — SIGNIFICANT CHANGE UP (ref 7–60)
LYMPHOCYTES # BLD AUTO: 1.89 K/UL — SIGNIFICANT CHANGE UP (ref 1–3.3)
LYMPHOCYTES # BLD AUTO: 30.5 % — SIGNIFICANT CHANGE UP (ref 13–44)
MCHC RBC-ENTMCNC: 29.3 PG — SIGNIFICANT CHANGE UP (ref 27–34)
MCHC RBC-ENTMCNC: 31.2 GM/DL — LOW (ref 32–36)
MCV RBC AUTO: 94 FL — SIGNIFICANT CHANGE UP (ref 80–100)
MONOCYTES # BLD AUTO: 0.58 K/UL — SIGNIFICANT CHANGE UP (ref 0–0.9)
MONOCYTES NFR BLD AUTO: 9.4 % — SIGNIFICANT CHANGE UP (ref 2–14)
NEUTROPHILS # BLD AUTO: 3.12 K/UL — SIGNIFICANT CHANGE UP (ref 1.8–7.4)
NEUTROPHILS NFR BLD AUTO: 50.3 % — SIGNIFICANT CHANGE UP (ref 43–77)
NRBC # BLD: 0 /100 WBCS — SIGNIFICANT CHANGE UP (ref 0–0)
PLATELET # BLD AUTO: 159 K/UL — SIGNIFICANT CHANGE UP (ref 150–400)
POTASSIUM SERPL-MCNC: 5.5 MMOL/L — HIGH (ref 3.5–5.3)
POTASSIUM SERPL-MCNC: SIGNIFICANT CHANGE UP MMOL/L (ref 3.5–5.3)
POTASSIUM SERPL-MCNC: SIGNIFICANT CHANGE UP MMOL/L (ref 3.5–5.3)
POTASSIUM SERPL-SCNC: 5.5 MMOL/L — HIGH (ref 3.5–5.3)
POTASSIUM SERPL-SCNC: SIGNIFICANT CHANGE UP MMOL/L (ref 3.5–5.3)
POTASSIUM SERPL-SCNC: SIGNIFICANT CHANGE UP MMOL/L (ref 3.5–5.3)
PROT SERPL-MCNC: 6.9 G/DL — SIGNIFICANT CHANGE UP (ref 6–8.3)
PROT SERPL-MCNC: 7.1 G/DL — SIGNIFICANT CHANGE UP (ref 6–8.3)
PROT SERPL-MCNC: 7.2 G/DL — SIGNIFICANT CHANGE UP (ref 6–8.3)
PROTHROM AB SERPL-ACNC: 12.7 SEC — SIGNIFICANT CHANGE UP (ref 10.6–13.6)
RBC # BLD: 3.48 M/UL — LOW (ref 4.2–5.8)
RBC # FLD: 13.8 % — SIGNIFICANT CHANGE UP (ref 10.3–14.5)
RH IG SCN BLD-IMP: POSITIVE — SIGNIFICANT CHANGE UP
SODIUM SERPL-SCNC: 135 MMOL/L — SIGNIFICANT CHANGE UP (ref 135–145)
SODIUM SERPL-SCNC: 136 MMOL/L — SIGNIFICANT CHANGE UP (ref 135–145)
SODIUM SERPL-SCNC: 136 MMOL/L — SIGNIFICANT CHANGE UP (ref 135–145)
WBC # BLD: 6.2 K/UL — SIGNIFICANT CHANGE UP (ref 3.8–10.5)
WBC # FLD AUTO: 6.2 K/UL — SIGNIFICANT CHANGE UP (ref 3.8–10.5)

## 2020-08-27 PROCEDURE — 83690 ASSAY OF LIPASE: CPT

## 2020-08-27 PROCEDURE — 86901 BLOOD TYPING SEROLOGIC RH(D): CPT

## 2020-08-27 PROCEDURE — 99285 EMERGENCY DEPT VISIT HI MDM: CPT

## 2020-08-27 PROCEDURE — 85730 THROMBOPLASTIN TIME PARTIAL: CPT

## 2020-08-27 PROCEDURE — 47537 REMOVAL BILIARY DRG CATH: CPT

## 2020-08-27 PROCEDURE — 99284 EMERGENCY DEPT VISIT MOD MDM: CPT | Mod: 25

## 2020-08-27 PROCEDURE — 85025 COMPLETE CBC W/AUTO DIFF WBC: CPT

## 2020-08-27 PROCEDURE — 86850 RBC ANTIBODY SCREEN: CPT

## 2020-08-27 PROCEDURE — 76705 ECHO EXAM OF ABDOMEN: CPT | Mod: 26

## 2020-08-27 PROCEDURE — 85610 PROTHROMBIN TIME: CPT

## 2020-08-27 PROCEDURE — 36415 COLL VENOUS BLD VENIPUNCTURE: CPT

## 2020-08-27 PROCEDURE — 80053 COMPREHEN METABOLIC PANEL: CPT

## 2020-08-27 PROCEDURE — 76705 ECHO EXAM OF ABDOMEN: CPT

## 2020-08-27 NOTE — ED PROVIDER NOTE - PSYCHIATRIC, MLM
Patient called in requesting her results.  Patient can be reached at [phone].   Alert and oriented to person, place, time/situation. normal mood and affect. no apparent risk to self or others.

## 2020-08-27 NOTE — ED PROVIDER NOTE - PATIENT PORTAL LINK FT
You can access the FollowMyHealth Patient Portal offered by [facility] by registering at the following website: [email or web address]. By joining [facility]’s FollowMyHealth portal, you will also be able to view your health information using other applications (apps) compatible with our system.

## 2020-08-27 NOTE — CONSULT NOTE ADULT - ASSESSMENT
This is a 89 y/o M, HTN, BPH, GERD, was admitted in the hospital before in 10/2019 for acute calcular cholecystitis and per catrachita was done for him as he was not fit for surgery at that time, seen by Dr. Alvarenga in the clinic in 01/2020. The patient's daughter called Dr. Alvarenga's office as the tube of the per catrachita wasn't bringing any output since Friday, before that it was bringing around 100 ml per day, they were told to come to the ED for eval.    the patient has no symptoms, no abdominal tenderness, labs and vitals are WNL    USS abdomen is normal showing per catrachita tube in place    IR tube study was done and showed patent cystic duct, discussed with Dr. Alvarenga whop advised no further need for the tube and it was removed while in IR    Plan:  - Patient is reassured  - FU with Dr. Alvarenga

## 2020-08-27 NOTE — CONSULT NOTE ADULT - SUBJECTIVE AND OBJECTIVE BOX
This is a 89 y/o M, HTN, BPH, GERD, was admitted in the hospital before in 10/2019 for acute calcular cholecystitis and per catrachita was done for him as he was not fit for surgery at that time, seen by Dr. Alvarenga in the clinic in 01/2020. The patient's daughter called Dr. Alvarenga's office as the tube of the per catrachita wasn't bringing any output since Friday, before that it was bringing around 100 ml per day, they were told to come to the ED for eval.  The patient denies any symptoms no abdominal pain, no n/v or f/c, stable weight.    PAST MEDICAL & SURGICAL HISTORY:  CKD (chronic kidney disease) stage 3, GFR 30-59 ml/min  BPH (benign prostatic hyperplasia)  GERD (gastroesophageal reflux disease)  HTN (hypertension)  H/O surgical procedure: percutaneous cholecystostomy      Home Medications:  Flomax 0.4 mg oral capsule: 1 cap(s) orally 2 times a day (18 May 2020 19:26)  omeprazole 20 mg oral delayed release capsule: 1 cap(s) orally once a day (18 May 2020 19:28)    Vital Signs Last 24 Hrs  T(C): 36.3 (27 Aug 2020 17:49), Max: 36.6 (27 Aug 2020 11:49)  T(F): 97.4 (27 Aug 2020 17:49), Max: 97.9 (27 Aug 2020 11:49)  HR: 60 (27 Aug 2020 17:49) (60 - 63)  BP: 174/70 (27 Aug 2020 17:49) (158/82 - 174/70)  BP(mean): --  RR: 18 (27 Aug 2020 17:49) (18 - 18)  SpO2: 100% (27 Aug 2020 17:49) (100% - 100%)    Gen: NAD, resting comfortably in bed  Pulm: Good inspiratory effort, nonlabored breathing  C/V: NSR  Abd: Soft, non tender, nondistended. No rebound, no guarding. Per catrachita tube in place, minimal drainage in the leg bag  Extrem: WWP, no edema                          10.2   6.20  )-----------( 159      ( 27 Aug 2020 12:41 )             32.7   08-27    136  |  107  |  30<H>  ----------------------------<  90  5.5<H>   |  21<L>  |  1.35<H>    Ca    9.2      27 Aug 2020 14:23    TPro  6.9  /  Alb  3.3  /  TBili  0.2  /  DBili  x   /  AST  15  /  ALT  7<L>  /  AlkPhos  71  08-27    < from: US Abdomen Limited (08.27.20 @ 15:31) >  FINDINGS:    Aorta and IVC: Aortic calcifications.  Liver: Within normal limits.  Gallbladder: Cholecystostomy tube within the lumen. The lumen is mildly distended. No wall thickening or pericholecystic fluid. No visible stones. Negative sonographic Rebolledo sign.  Bile ducts: Normal caliber. Common hepatic duct measures 6 mm.  Pancreas: Limited  Right kidney: 8.7 cm. No hydronephrosis. Several cysts.  Ascites: None.    IMPRESSION:    Cholecystostomy tube. The gallbladder lumen is distended but without evidence of cholecystitis.      < end of copied text >  < from: IR Procedure. (08.27.20 @ 17:21) >  Fluoroscopy time: 0.9 minute    Procedure:  Informed consent was obtained.  The patient was placed in the supine position. Contrast was injected through the indwelling cholecystostomy tube and fluoroscopic images were obtained. The tube was then removed and a sterile dressing was applied.    The patient tolerated the procedure well and left the department in satisfactory condition.  There were no immediate complications.    Findings:  The gallbladder lumen fills with contrast. The pigtail of the tube is in the gallbladder lumen. The cystic duct and common bile duct are patent. There is flow of contrast from the CBD into the small bowel. There is no evidence of choledocholithiasis.    Impression:  Cholecystostomy tube check demonstrating patent cystic and common bile ducts. The tube was removed, as described above.    < end of copied text >

## 2020-08-27 NOTE — ED PROVIDER NOTE - OBJECTIVE STATEMENT
89 yo M w/ PMHx HTN, CKD stage 3, GERD, BPH, s/p perc catrachita with bag (in October 2019 for acute catrachita) presenting to the ED w/ decreased bilious drainage in bag. Starting Friday, minimal drainage in the perc catrachita drainage bag; minimal bilious drainage w/o blood or pus. Typically drains 100ml daily. Called Dr. Alvarenga's office on Monday, and received a call back on Wednesday to bring patient to the ED for further assessment. Patient has lost 45 lbs since perc catrachita last October. Denies pain or erythema in the drainage area. Denies fever, chills, cough, chest pain, SOB, abdominal pain, changes in BM or urination. Currently on omeprazole 20mg, finasteride 5mg, tamsulosin 0.4mg BID, sodium bicarbonate 650mg BID, hydrochlorothiazide 12.5mg, acetaminophen PRN for arthritis.

## 2020-08-27 NOTE — ED PROVIDER NOTE - NSFOLLOWUPINSTRUCTIONS_ED_ALL_ED_FT
Please see your surgeon as directed for followup.  Call for appointment.  If you have any problems with followup, please call the ED Referral Coordinator at [phone].  Return to the ER if symptoms worsen or other concerns.    Acute Abdominal Pain    WHAT YOU NEED TO KNOW:    The cause of your abdominal pain may not be found. If a cause is found, treatment will depend on what the cause is.     DISCHARGE INSTRUCTIONS:    Return to the emergency department if:     You vomit blood or cannot stop vomiting.      You have blood in your bowel movement or it looks like tar.       You have bleeding from your rectum.       Your abdomen is larger than usual, more painful, and hard.       You have severe pain in your abdomen.       You stop passing gas and having bowel movements.       You feel weak, dizzy, or faint.    Contact your healthcare provider if:     You have a fever.      You have new signs and symptoms.      Your symptoms do not get better with treatment.       You have questions or concerns about your condition or care.    Medicines may be given to decrease pain, treat an infection, and manage your symptoms. Take your medicine as directed. Call your healthcare provider if you think your medicine is not helping or if you have side effects. Tell him if you are allergic to any medicine. Keep a list of the medicines, vitamins, and herbs you take. Include the amounts, and when and why you take them. Bring the list or the pill bottles to follow-up visits. Carry your medicine list with you in case of an emergency.    Manage your symptoms:     Apply heat on your abdomen for 20 to 30 minutes every 2 hours for as many days as directed. Heat helps decrease pain and muscle spasms.       Manage your stress. Stress may cause abdominal pain. Your healthcare provider may recommend relaxation techniques and deep breathing exercises to help decrease your stress. Your healthcare provider may recommend you talk to someone about your stress or anxiety, such as a counselor or a trusted friend. Get plenty of sleep and exercise regularly.       Limit or do not drink alcohol. Alcohol can make your abdominal pain worse. Ask your healthcare provider if it is safe for you to drink alcohol. Also ask how much is safe for you to drink.       Do not smoke. Nicotine and other chemicals in cigarettes can damage your esophagus and stomach. Ask your healthcare provider for information if you currently smoke and need help to quit. E-cigarettes or smokeless tobacco still contain nicotine. Talk to your healthcare provider before you use these products.     Make changes to the food you eat as directed: Do not eat foods that cause abdominal pain or other symptoms. Eat small meals more often.     Eat more high-fiber foods if you are constipated. High-fiber foods include fruits, vegetables, whole-grain foods, and legumes.       Do not eat foods that cause gas if you have bloating. Examples include broccoli, cabbage, and cauliflower. Do not drink soda or carbonated drinks, because these may also cause gas.       Do not eat foods or drinks that contain sorbitol or fructose if you have diarrhea and bloating. Some examples are fruit juices, candy, jelly, and sugar-free gum.       Do not eat high-fat foods, such as fried foods, cheeseburgers, hot dogs, and desserts.      Limit or do not drink caffeine. Caffeine may make symptoms, such as heart burn or nausea, worse.       Drink plenty of liquids to prevent dehydration from diarrhea or vomiting. Ask your healthcare provider how much liquid to drink each day and which liquids are best for you.     Follow up with your healthcare provider as directed: Write down your questions so you remember to ask them during your visits.        SEEK IMMEDIATE MEDICAL CARE IF YOU HAVE ANY OF THE FOLLOWING SYMPTOMS: worsening abdominal pain, uncontrollable vomiting, profuse diarrhea, inability to have bowel movements or pass gas, black or bloody stools, fever accompanying chest pain or back pain, or fainting. These symptoms may represent a serious problem that is an emergency. Do not wait to see if the symptoms will go away. Get medical help right away. Call 911 and do not drive yourself to the hospital.

## 2020-08-27 NOTE — ED PROVIDER NOTE - ATTENDING CONTRIBUTION TO CARE
I have personally seen and examined this patient.  I have fully participated in the care of this patient.  I have reviewed all pertinent clinical information, including history, physical exam, plan and the medical student's note and agree except as noted.    presents with decreased drainage from biliary drain for several days.  denies pain, fever, n/v.  otherwise feels well.  labs done with normal wbc, lft. surgery consulted, requested IR drain study which showed patent ducts, drain removed.  cleared for dc by surgery.  return precautions discussed

## 2020-08-27 NOTE — ED ADULT NURSE NOTE - OBJECTIVE STATEMENT
Pt presents to ED w/ biliary T tube in place since OCtober. Pt states drainage stopped friday night. Drainage in bag appears green/clear. Insertion site with no erythema or pain. Denies N/V. Pt unsure why Drain is in place. Pt MAEx4, ambulates with walker. AAOx3.

## 2020-08-27 NOTE — ED ADULT NURSE REASSESSMENT NOTE - NS ED NURSE REASSESS COMMENT FT1
Pt had US and IR procedure. Drain D/C. Pt ok for DC. Instructions given to follow up outpatient. Pt ambulated off unit with family.

## 2020-08-27 NOTE — ED PROVIDER NOTE - GASTROINTESTINAL NEGATIVE STATEMENT, MLM
no abdominal pain, no bloating, no constipation, no diarrhea, no nausea and no vomiting. no abdominal pain, no bloating, no constipation, no diarrhea, no nausea and no vomiting. decreased bilious drainage since last Friday.

## 2020-08-27 NOTE — ED PROVIDER NOTE - CLINICAL SUMMARY MEDICAL DECISION MAKING FREE TEXT BOX
89 yo M w/ PMHx HTN, CKD stage 3, GERD, BPH, s/p perc catrachita with bag (in October 2019 for acute catrachita) presenting to the ED w/ decreased bilious drainage in bag. Differential dx include biliary obstruction, 89 yo M w/ PMHx HTN, CKD stage 3, GERD, BPH, s/p perc catrachita with bag (in October 2019 for acute catrahcita) presenting to the ED w/ decreased bilious drainage in bag. Differential dx include ?extrahepatic/intrahepatic biliary obstruction? Labs and imaging (RUQ ultrasound). Reassess. 89 yo M w/ PMHx HTN, CKD stage 3, GERD, BPH, s/p perc catrachita with bag (in October 2019 for acute catrachita) presenting to the ED w/ decreased bilious drainage in bag. Differential dx include ?extrahepatic/intrahepatic biliary obstruction? Labs and imaging (RUQ ultrasound). Reassess.    8/27/20 2:45pm - IR tube study ordered. VS stable.

## 2020-08-27 NOTE — ED PROVIDER NOTE - PMH
BPH (benign prostatic hyperplasia)    CKD (chronic kidney disease) stage 3, GFR 30-59 ml/min    GERD (gastroesophageal reflux disease)    HTN (hypertension)

## 2020-08-27 NOTE — ED PROVIDER NOTE - GASTROINTESTINAL, MLM
Abdomen soft, non-tender, no guarding. + BM x4. Perc catrachita bag in place w minimal (~10ml bilious, non-bloody, non-purulent) drainage

## 2020-08-31 DIAGNOSIS — Z48.03 ENCOUNTER FOR CHANGE OR REMOVAL OF DRAINS: ICD-10-CM

## 2020-08-31 DIAGNOSIS — R10.9 UNSPECIFIED ABDOMINAL PAIN: ICD-10-CM

## 2020-09-17 ENCOUNTER — APPOINTMENT (OUTPATIENT)
Dept: UROLOGY | Facility: CLINIC | Age: 85
End: 2020-09-17
Payer: MEDICARE

## 2020-09-17 VITALS
DIASTOLIC BLOOD PRESSURE: 82 MMHG | OXYGEN SATURATION: 96 % | HEART RATE: 77 BPM | TEMPERATURE: 98.6 F | SYSTOLIC BLOOD PRESSURE: 170 MMHG

## 2020-09-17 PROCEDURE — 99213 OFFICE O/P EST LOW 20 MIN: CPT

## 2020-09-17 NOTE — HISTORY OF PRESENT ILLNESS
[FreeTextEntry1] : This is a 89 year old gentlemen with history of urinary retention\par Multiple failed voiding trials\par Would like to attempt void trial today.  He is excited because his catrachita tube was removed 1 week ago\par \par Feels well today\par No fever, chills, hematuria\par Has been managing the catheter without any complications\par \par Trial Void today

## 2020-09-17 NOTE — PHYSICAL EXAM
[General Appearance - Well Developed] : well developed [General Appearance - Well Nourished] : well nourished [Normal Appearance] : normal appearance [Well Groomed] : well groomed [General Appearance - In No Acute Distress] : no acute distress [Abdomen Soft] : soft [Abdomen Tenderness] : non-tender [Costovertebral Angle Tenderness] : no ~M costovertebral angle tenderness [Edema] : no peripheral edema [] : no respiratory distress [Respiration, Rhythm And Depth] : normal respiratory rhythm and effort [Exaggerated Use Of Accessory Muscles For Inspiration] : no accessory muscle use [Oriented To Time, Place, And Person] : oriented to person, place, and time [Affect] : the affect was normal [Mood] : the mood was normal [Not Anxious] : not anxious [Normal Station and Gait] : the gait and station were normal for the patient's age [FreeTextEntry1] : uncircumcised penis with phimosis

## 2020-09-17 NOTE — ASSESSMENT
[FreeTextEntry1] : Trial Void today\par -Catheter removed today, without complicaiton\par -will follow up in am for PVR check, spoke with patients daughter in law Freda who will bring patient back tomorrow morning.\par -Reviewed acute urinary retention, s/s to monitor for with patient

## 2020-09-18 ENCOUNTER — APPOINTMENT (OUTPATIENT)
Dept: UROLOGY | Facility: CLINIC | Age: 85
End: 2020-09-18
Payer: MEDICARE

## 2020-09-18 VITALS — SYSTOLIC BLOOD PRESSURE: 141 MMHG | DIASTOLIC BLOOD PRESSURE: 61 MMHG | HEART RATE: 74 BPM | TEMPERATURE: 97.8 F

## 2020-09-18 PROBLEM — N18.3 CHRONIC KIDNEY DISEASE, STAGE 3 (MODERATE): Chronic | Status: ACTIVE | Noted: 2020-08-27

## 2020-09-18 PROCEDURE — 99213 OFFICE O/P EST LOW 20 MIN: CPT | Mod: 25

## 2020-09-18 PROCEDURE — 51702 INSERT TEMP BLADDER CATH: CPT

## 2020-09-18 NOTE — ASSESSMENT
[FreeTextEntry1] : reucrrent urinary retetnion\par stroud reinserted\par f/u 3 months consider repeat TOV\par for home care for stroud cahnges

## 2020-09-18 NOTE — HISTORY OF PRESENT ILLNESS
[FreeTextEntry1] : s/p voiding trial overnight\par +pain with initnating stream\par feeling of incomplete empty\par dysuria\par PVR over 250\par increasingly bothered\par requesting stroud inseriont

## 2020-09-21 DIAGNOSIS — N39.0 URINARY TRACT INFECTION, SITE NOT SPECIFIED: ICD-10-CM

## 2020-09-21 LAB — BACTERIA UR CULT: ABNORMAL

## 2020-09-21 RX ORDER — SULFAMETHOXAZOLE AND TRIMETHOPRIM 800; 160 MG/1; MG/1
800-160 TABLET ORAL TWICE DAILY
Qty: 14 | Refills: 0 | Status: ACTIVE | COMMUNITY
Start: 2020-09-21 | End: 1900-01-01

## 2020-09-21 RX ORDER — SULFAMETHOXAZOLE AND TRIMETHOPRIM 800; 160 MG/1; MG/1
800-160 TABLET ORAL
Qty: 14 | Refills: 0 | Status: DISCONTINUED | COMMUNITY
Start: 2020-05-12 | End: 2020-09-21

## 2020-09-23 ENCOUNTER — FORM ENCOUNTER (OUTPATIENT)
Age: 85
End: 2020-09-23

## 2020-12-23 PROBLEM — N39.0 URINARY TRACT INFECTION WITHOUT HEMATURIA, SITE UNSPECIFIED: Status: RESOLVED | Noted: 2020-09-21 | Resolved: 2020-12-23

## 2021-01-15 ENCOUNTER — APPOINTMENT (OUTPATIENT)
Dept: UROLOGY | Facility: CLINIC | Age: 86
End: 2021-01-15
Payer: MEDICARE

## 2021-01-15 VITALS
SYSTOLIC BLOOD PRESSURE: 144 MMHG | HEART RATE: 81 BPM | HEIGHT: 60 IN | BODY MASS INDEX: 27.48 KG/M2 | TEMPERATURE: 97.8 F | DIASTOLIC BLOOD PRESSURE: 80 MMHG | WEIGHT: 140 LBS

## 2021-01-15 DIAGNOSIS — R33.9 RETENTION OF URINE, UNSPECIFIED: ICD-10-CM

## 2021-01-15 PROCEDURE — 51798 US URINE CAPACITY MEASURE: CPT

## 2021-01-15 PROCEDURE — 99072 ADDL SUPL MATRL&STAF TM PHE: CPT

## 2021-01-15 PROCEDURE — 99213 OFFICE O/P EST LOW 20 MIN: CPT | Mod: 25

## 2021-01-15 NOTE — PHYSICAL EXAM
[General Appearance - Well Developed] : well developed [General Appearance - Well Nourished] : well nourished [Normal Appearance] : normal appearance [Well Groomed] : well groomed [Abdomen Soft] : soft [Skin Color & Pigmentation] : normal skin color and pigmentation [] : no respiratory distress [Oriented To Time, Place, And Person] : oriented to person, place, and time [Affect] : the affect was normal [Mood] : the mood was normal [Not Anxious] : not anxious [Normal Station and Gait] : the gait and station were normal for the patient's age [No Focal Deficits] : no focal deficits [No Palpable Adenopathy] : no palpable adenopathy

## 2021-01-18 NOTE — HISTORY OF PRESENT ILLNESS
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

## 2021-01-18 NOTE — ASSESSMENT
[FreeTextEntry1] : urinary reteton\par voiding well\par PVR 150s\par f/u in office in 4 days to confirm no retention

## 2021-01-19 ENCOUNTER — APPOINTMENT (OUTPATIENT)
Dept: UROLOGY | Facility: CLINIC | Age: 86
End: 2021-01-19
Payer: MEDICARE

## 2021-01-19 VITALS — DIASTOLIC BLOOD PRESSURE: 67 MMHG | SYSTOLIC BLOOD PRESSURE: 116 MMHG | HEART RATE: 105 BPM | TEMPERATURE: 98 F

## 2021-01-19 DIAGNOSIS — R33.9 RETENTION OF URINE, UNSPECIFIED: ICD-10-CM

## 2021-01-19 PROCEDURE — 99213 OFFICE O/P EST LOW 20 MIN: CPT | Mod: 25

## 2021-01-19 PROCEDURE — 99072 ADDL SUPL MATRL&STAF TM PHE: CPT

## 2021-01-19 PROCEDURE — 51798 US URINE CAPACITY MEASURE: CPT

## 2021-01-19 NOTE — PHYSICAL EXAM
[General Appearance - Well Developed] : well developed [Abdomen Soft] : soft [Skin Color & Pigmentation] : normal skin color and pigmentation [Heart Rate And Rhythm] : Heart rate and rhythm were normal [] : no respiratory distress [Oriented To Time, Place, And Person] : oriented to person, place, and time [Not Anxious] : not anxious [Normal Station and Gait] : the gait and station were normal for the patient's age [No Focal Deficits] : no focal deficits

## 2021-01-19 NOTE — ASSESSMENT
[FreeTextEntry1] : urinary retneiton\par PVR today 250s\par continue following, no SP pressure\par spoke to patient's daughter Freda, who understands\par f/u in one month\par if worse then, consider re-insert catheter back, and continue as per prior plan\par changes with home nurse\par

## 2021-01-19 NOTE — HISTORY OF PRESENT ILLNESS
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

## 2021-02-18 ENCOUNTER — APPOINTMENT (OUTPATIENT)
Dept: UROLOGY | Facility: CLINIC | Age: 86
End: 2021-02-18

## 2021-02-19 ENCOUNTER — APPOINTMENT (OUTPATIENT)
Dept: UROLOGY | Facility: CLINIC | Age: 86
End: 2021-02-19
Payer: MEDICARE

## 2021-03-10 ENCOUNTER — EMERGENCY (EMERGENCY)
Facility: HOSPITAL | Age: 86
LOS: 1 days | Discharge: HOME | End: 2021-03-10
Attending: EMERGENCY MEDICINE | Admitting: EMERGENCY MEDICINE
Payer: MEDICARE

## 2021-03-10 VITALS
SYSTOLIC BLOOD PRESSURE: 101 MMHG | OXYGEN SATURATION: 99 % | TEMPERATURE: 98 F | DIASTOLIC BLOOD PRESSURE: 59 MMHG | RESPIRATION RATE: 16 BRPM | HEART RATE: 98 BPM

## 2021-03-10 VITALS
HEART RATE: 106 BPM | SYSTOLIC BLOOD PRESSURE: 105 MMHG | OXYGEN SATURATION: 99 % | TEMPERATURE: 98 F | RESPIRATION RATE: 18 BRPM | WEIGHT: 126.1 LBS | HEIGHT: 70 IN | DIASTOLIC BLOOD PRESSURE: 63 MMHG

## 2021-03-10 DIAGNOSIS — K62.89 OTHER SPECIFIED DISEASES OF ANUS AND RECTUM: ICD-10-CM

## 2021-03-10 DIAGNOSIS — Z98.890 OTHER SPECIFIED POSTPROCEDURAL STATES: Chronic | ICD-10-CM

## 2021-03-10 DIAGNOSIS — K59.00 CONSTIPATION, UNSPECIFIED: ICD-10-CM

## 2021-03-10 DIAGNOSIS — R10.9 UNSPECIFIED ABDOMINAL PAIN: ICD-10-CM

## 2021-03-10 PROCEDURE — 99284 EMERGENCY DEPT VISIT MOD MDM: CPT | Mod: 25

## 2021-03-10 PROCEDURE — 74019 RADEX ABDOMEN 2 VIEWS: CPT | Mod: 26

## 2021-03-10 PROCEDURE — 99283 EMERGENCY DEPT VISIT LOW MDM: CPT

## 2021-03-10 PROCEDURE — 74019 RADEX ABDOMEN 2 VIEWS: CPT

## 2021-03-10 RX ORDER — GLYCERIN ADULT
1 SUPPOSITORY, RECTAL RECTAL ONCE
Refills: 0 | Status: COMPLETED | OUTPATIENT
Start: 2021-03-10 | End: 2021-03-10

## 2021-03-10 RX ORDER — POLYETHYLENE GLYCOL 3350 17 G/17G
17 POWDER, FOR SOLUTION ORAL
Qty: 100 | Refills: 0
Start: 2021-03-10 | End: 2021-03-14

## 2021-03-10 RX ORDER — DOCUSATE SODIUM 100 MG
1 CAPSULE ORAL
Qty: 15 | Refills: 0
Start: 2021-03-10 | End: 2021-03-14

## 2021-03-10 RX ORDER — POLYETHYLENE GLYCOL 3350 17 G/17G
17 POWDER, FOR SOLUTION ORAL ONCE
Refills: 0 | Status: COMPLETED | OUTPATIENT
Start: 2021-03-10 | End: 2021-03-10

## 2021-03-10 RX ADMIN — POLYETHYLENE GLYCOL 3350 17 GRAM(S): 17 POWDER, FOR SOLUTION ORAL at 14:44

## 2021-03-10 RX ADMIN — Medication 1 SUPPOSITORY(S): at 14:44

## 2021-03-10 NOTE — ED PROVIDER NOTE - NSFOLLOWUPINSTRUCTIONS_ED_ALL_ED_FT
Please take medication as directed and follow-up with Gastroenterology. Return to the Emergency Department if you have any new or worsening symptoms, or if you have any concerns.    Please reach out to Estee Bell ([facility] ED clinical referral coordinator) to assist you with your follow-up appointment.     Monday - Friday 11am-7pm  [phone]  [email or web address]    Constipation    WHAT YOU NEED TO KNOW:    Constipation is when you have hard, dry bowel movements, or you go longer than usual between bowel movements.     DISCHARGE INSTRUCTIONS:    Call your doctor if:   •You have blood in your bowel movements.      •You have a fever and abdominal pain with the constipation.      •Your constipation gets worse.       •You start to vomit.      •You have questions or concerns about your condition or care.      Medicines:   •Medicine such as a laxative may help relax and loosen your intestines to help you have a bowel movement. Your provider may recommend you only use laxatives for a short time. Long-term use may make your bowels dependent on the medicine.      •Take your medicine as directed. Contact your healthcare provider if you think your medicine is not helping or if you have side effects. Tell him of her if you are allergic to any medicine. Keep a list of the medicines, vitamins, and herbs you take. Include the amounts, and when and why you take them. Bring the list or the pill bottles to follow-up visits. Carry your medicine list with you in case of an emergency.      Relieve constipation:   •A suppository may be used to help soften your bowel movements. This may make them easier to pass. A suppository is guided into your rectum through your anus.  Suppository for Constipation           •An enema is liquid medicine used to clear bowel movement from your rectum. The medicine is put into your rectum through your anus.  Enemas           Prevent constipation:   •Drink liquids as directed. You may need to drink extra liquids to help soften and move your bowels. Ask how much liquid to drink each day and which liquids are best for you.       •Eat high-fiber foods. This may help decrease constipation by adding bulk to your bowel movements. High-fiber foods include fruit, vegetables, whole-grain breads and cereals, and beans. Your healthcare provider or dietitian can help you create a high-fiber meal plan. Your provider may also recommend a fiber supplement if you cannot get enough fiber from food.              •Exercise regularly. Regular physical activity can help stimulate your intestines. Walking is a good exercise to prevent or relieve constipation. Ask which exercises are best for you.  Walking for Exercise           •Schedule a time each day to have a bowel movement. This may help train your body to have regular bowel movements. Bend forward while you are on the toilet to help move the bowel movement out. Sit on the toilet for at least 10 minutes, even if you do not have a bowel movement.       •Talk to your healthcare provider about your medicines. Certain medicines, such as opioids, can cause constipation. Your provider may be able to make medicine changes. For example, he or she may change the kind of medicine, or change when you take it.      Follow up with your healthcare provider as directed: Write down your questions so you remember to ask them during your visits.        © Copyright [employer] 2021           back to top                          © Copyright [employer] 2021

## 2021-03-10 NOTE — ED ADULT NURSE NOTE - OBJECTIVE STATEMENT
Pt is a 89y male complaining of constipation. Pt report last BM being this morning but states, " It was very painful and difficult it was just a small amount." Pt reports feeling this constipation for 3 days. No abdominal tenderness on palpation . No abomination distension. No diarrhea or N/V. Pt uses walker to get around. Pt cannot recall his medical conditions and says "they are in the computer." Pt had dressing noted to RLQ and says, "It was for something to do with my bladder but they took it out." Pt denies CP, SOB, fever, chills, dizziness, numbness, tingling.

## 2021-03-10 NOTE — ED PROVIDER NOTE - OBJECTIVE STATEMENT
89 y/o M with PMHx HTN, BPH, GERD, CKD, presents to the ED c/o constipation for the past 2 days. Pt states over the past 2 days he has been having a sensation to make a bowel movement, however has been unable to do so. Says it feels as if it is located right at the exit, but has been unable to push it out. Pt took a laxative yesterday without any improvement in his symptoms. Denies the following: fever, chills, nausea, vomiting, abdominal pain, or rectal bleeding. Pt admits he has been able to urinate without any discomfort.

## 2021-03-10 NOTE — ED PROVIDER NOTE - PATIENT PORTAL LINK FT
You can access the FollowMyHealth Patient Portal offered by [facility] by registering at the following website: [email or web address]. By joining [employer]’s FollowMyHealth portal, you will also be able to view your health information using other applications (apps) compatible with our system.

## 2021-03-10 NOTE — ED PROVIDER NOTE - CLINICAL SUMMARY MEDICAL DECISION MAKING FREE TEXT BOX
89 y/o M presents to the ED c/o having constipation x 2 days. No nausea, vomiting, or abdominal pain. No clinical signs or symptoms of obstruction. Abd XR negative for SBO. Plan for laxative, suppository, and possible fecal disimpaction. 89 y/o M presents to the ED c/o having constipation x 2 days. No nausea, vomiting, or abdominal pain. No clinical signs or symptoms of SBO. Abd XR negative for SBO. Plan for laxative, suppository, and possible fecal disimpaction. Pt with + bm here in the ED and fecal disimpaction successful with improvement. Will cont. bowel regimen. I have discussed the discharge plan with the patient. The patient agrees with the plan, as discussed.  The patient understands Emergency Department diagnosis is a preliminary diagnosis often based on limited information and that the patient must adhere to the follow-up plan as discussed.  The patient understands that if the symptoms worsen or if prescribed medications do not have the desired/planned effect that the patient may return to the Emergency Department at any time for further evaluation and treatment.

## 2021-03-10 NOTE — ED ADULT NURSE REASSESSMENT NOTE - NS ED NURSE REASSESS COMMENT FT1
pt had 2 bowel movements in Ed light brown formed stool noted no blood pt verbalized feeling relief. verbalized understanding of dc instructions. Ambulatory with walker out of ED with steady gait noted. Spoke with sister Freda who picked pt up was escorted out with EDT Juliane

## 2021-03-10 NOTE — ED ADULT TRIAGE NOTE - CHIEF COMPLAINT QUOTE
Pt presents as walk-in w/ walker w/ c/o of constipation. Pt states last BM was 2 days ago, denies abdominal pain, distention, diarrhaea.

## 2021-03-10 NOTE — ED PROVIDER NOTE - NS ED ROS FT
General: no fever, chills, confusion  Cardiac: no chest pain, chest tightness, palpitations  Lungs: no sob, difficulty breathing  Abdomen: + constipation  no abdominal pain, nausea, vomiting, diarrhea  : no dysuria, urinary frequency/urgency    All other systems negative except as per HPI General: no fever, chills, confusion  Cardiac: no chest pain, chest tightness, palpitations  Lungs: no sob, difficulty breathing  Abdomen: no n/v or abdominal pain, + constipation  no abdominal pain, nausea, vomiting, diarrhea  : no dysuria, urinary frequency/urgency    All other systems negative except as per HPI

## 2021-03-10 NOTE — ED PROVIDER NOTE - ATTENDING CONTRIBUTION TO CARE
89 yo male h/o HTN, BPH, GERD, CKD c/o constipation x 2 d w/o associated abd or rectal pain.  Pt feels stool at rectum but unable to pass on his own.  No fever.  Well appearing, nad, nc/at, lung cta, heart reg, abd soft, nt, ext no gross deformity, no gross neuro deficits Pt c/o constipation, poss fecal impaction.  Plan xray - if c/w constipation, enema and poss manual disimpaction by pa prn.

## 2021-03-19 ENCOUNTER — APPOINTMENT (OUTPATIENT)
Dept: UROLOGY | Facility: CLINIC | Age: 86
End: 2021-03-19
Payer: MEDICARE

## 2021-03-19 VITALS — TEMPERATURE: 96 F | DIASTOLIC BLOOD PRESSURE: 58 MMHG | HEART RATE: 114 BPM | SYSTOLIC BLOOD PRESSURE: 91 MMHG

## 2021-03-19 PROCEDURE — 99072 ADDL SUPL MATRL&STAF TM PHE: CPT

## 2021-03-19 PROCEDURE — 51798 US URINE CAPACITY MEASURE: CPT

## 2021-03-19 PROCEDURE — 99212 OFFICE O/P EST SF 10 MIN: CPT

## 2021-03-20 NOTE — HISTORY OF PRESENT ILLNESS
[Urinary Incontinence] : urinary incontinence [Urinary Urgency] : urinary urgency [Urinary Frequency] : urinary frequency [Nocturia] : nocturia [Weak Stream] : weak stream [Post-Void Dribbling] : post-void dribbling [Erectile Dysfunction] : Erectile Dysfunction [FreeTextEntry1] : this a 89 year old male for follow up visit. Patient has history of urinary retention s/p chronic Bettencourt removal in January. Hx of +UCx for E. coli (Jan 2021.Currently able to spontaneously void. States frequency, urgency is better . Occasional post void dribble.\par  Denies dysuria, burning , or hematuria.\par On Finasteride and Tamsulosin. \par  [Urinary Retention] : no urinary retention [Straining] : no straining [Intermittency] : no intermittency [Dysuria] : no dysuria [Hematuria - Gross] : no gross hematuria [Hematuria - Microscopic] : no microscopic hematuria [Bladder Spasm] : no bladder spasm [Abdominal Pain] : no abdominal pain [Flank Pain] : no flank pain

## 2021-03-20 NOTE — ASSESSMENT
[FreeTextEntry1] : 89 year old male with Urinary retention\par Continue on Fibesteride and tamsulosin - tolerating well. \par PVR today 215\par F/u in one year.

## 2021-06-03 NOTE — ED ADULT NURSE NOTE - NSFALLRSKASSESASSIST_ED_ALL_ED
Consent: The patient's consent was obtained including but not limited to risks of crusting, scabbing, blistering, scarring, darker or lighter pigmentary change, recurrence, incomplete removal and infection. Post-Care Instructions: I reviewed with the patient in detail post-care instructions. Patient is to wear sunprotection, and avoid picking at any of the treated lesions. Pt may apply Vaseline to crusted or scabbing areas. Duration Of Freeze Thaw-Cycle (Seconds): 3 Detail Level: Simple Render Post-Care Instructions In Note?: no yes

## 2022-09-30 NOTE — ED ADULT NURSE NOTE - NS ED NURSE LEVEL OF CONSCIOUSNESS AFFECT
Quality 47: Advance Care Plan: Advance care planning not documented, reason not otherwise specified.
Quality 110: Preventive Care And Screening: Influenza Immunization: Influenza immunization was not ordered or administered, reason not given
Detail Level: Detailed
Quality 431: Preventive Care And Screening: Unhealthy Alcohol Use - Screening: Patient not identified as an unhealthy alcohol user when screened for unhealthy alcohol use using a systematic screening method
Quality 111:Pneumonia Vaccination Status For Older Adults: Pneumococcal vaccine (PPSV23) was not administered on or after patientâs 60th birthday and before the end of the measurement period, reason not otherwise specified
Quality 130: Documentation Of Current Medications In The Medical Record: Current Medications Documented
Quality 226: Preventive Care And Screening: Tobacco Use: Screening And Cessation Intervention: Patient screened for tobacco use and is an ex/non-smoker
Calm

## 2023-08-01 NOTE — REASON FOR VISIT
Mother is grateful for call. Mom does not need a call back. Mom spoke with patient.   [Follow-up Visit ___] : a follow-up visit  for [unfilled]

## 2023-08-31 NOTE — ED ADULT TRIAGE NOTE - RESPIRATORY RATE (BREATHS/MIN)
[17

## 2024-01-08 NOTE — ED ADULT NURSE NOTE - NS ED NURSE IV DC DT
LOS: 7 days     Name: Brenda Munroe  Age/Sex: 75 y.o. female  :  1948        PCP: Bernadette Arevalo MD    Principal Problem:    Acute hypercapnic respiratory failure      Admission Information: Brenda Munroe is a 75 y.o. female with coronary artery disease status post CABG, hypertension, hyperlipidemia, chronic hypoxic and hypercapnic respiratory failure secondary to COPD, paroxysmal atrial fibrillation, dementia, arthritis, bilateral carotid stenosis, anxiety, history of psychosis, PUD, and GERD.    Chief Complaint: Altered mental status    Interval history: Patient has been stable overnight.  Heart rate ranged  yesterday.    Subjective   Due to her dementia, she is unable to participate in much history taking.  She does deny chest pain.  She reports that her shortness of air is improved from previous assessments.      Vital Signs  Vital Signs (last 72 hrs)          0700   0659  0700   0659  07 0659  07 1409   Most Recent      Temp (°F) 97.2 -  100.1    97.8 -  98.4    98.1 -  98.8      97.9     97.9 (36.6)  1031    Heart Rate 58 -  136    84 -  143    73 -  106    89 -  104     89  1244    Resp 18 -  20    18 -  20    16 -  20      18     18  1244    BP 97/57 -  169/90    95/69 -  140/92    101/57 -  136/76      99/62     99/62  1031    SpO2 (%) 90 -  98    90 -  99    92 -  96    93 -  98     98  1244    Flow (L/min)   2      2      2      2     2  1244          Temp:  [97.9 °F (36.6 °C)-98.8 °F (37.1 °C)] 97.9 °F (36.6 °C)  Heart Rate:  [] 89  Resp:  [16-20] 18  BP: ()/(58-81) 99/62  Body mass index is 27.07 kg/m².      Intake/Output Summary (Last 24 hours) at 2024 1409  Last data filed at 2024 1031  Gross per 24 hour   Intake 540 ml   Output 350 ml   Net 190 ml       Vitals and nursing note reviewed.   Constitutional:       Appearance: Not in distress. Chronically ill-appearing.       Interventions: Nasal cannula in place.      Comments: 2 L nasal cannula   Pulmonary:      Effort: Pulmonary effort is normal.      Breath sounds: Examination of the left-lower field reveals wheezing.   Cardiovascular:      Normal rate. Irregularly irregular rhythm.      Murmurs: There is no murmur.   Edema:     Peripheral edema absent.   Skin:     General: Skin is warm and dry.   Neurological:      Mental Status: Mental status is at baseline. Disoriented.         Telemetry: Atrial fibrillation 90s     Results Review:     Results from last 7 days   Lab Units 01/08/24  0243 01/07/24  0757 01/06/24  0315 01/05/24  0222 01/04/24  0112 01/03/24  0829 01/02/24  0407   WBC 10*3/mm3 7.69 6.82 7.00 6.40 6.90 7.72 4.08   HEMOGLOBIN g/dL 11.0* 11.8* 11.2* 12.4 12.4 13.4 11.4*   PLATELETS 10*3/mm3 135* 135* 137* 150 169 154 135*     Results from last 7 days   Lab Units 01/08/24  0243 01/07/24  0758 01/06/24  1437 01/06/24  0315 01/05/24  0222 01/04/24  1747 01/04/24  1143 01/04/24  0112 01/03/24  0829 01/02/24  0407   SODIUM mmol/L 142 140  --  141 144  --   --  144 140 142   POTASSIUM mmol/L 4.2 4.4 5.0 3.4* 3.7 3.8 4.4 3.2* 3.8 3.9   CHLORIDE mmol/L 106 105  --  102 102  --   --  100 98 97*   CO2 mmol/L 32.2* 30.0*  --  33.9* 35.6*  --   --  36.5* 33.3* 40.8*   BUN mg/dL 34* 32*  --  38* 28*  --   --  23 18 10   CREATININE mg/dL 0.95 0.84  --  1.30* 1.00  --   --  0.74 0.70 0.72   CALCIUM mg/dL 8.8 8.8  --  8.8 9.1  --   --  9.5 9.6 9.7   GLUCOSE mg/dL 106* 99  --  89 106*  --   --  117* 98 125*                 I reviewed the patient's new clinical results.  I reviewed the patient's new imaging results and agree with the interpretation.  I personally viewed and interpreted the patient's EKG/Telemetry data      Medication Review:   apixaban, 5 mg, Oral, Q12H  baclofen, 5 mg, Oral, BID  budesonide-formoterol, 2 puff, Inhalation, BID - RT   And  tiotropium bromide monohydrate, 2 puff, Inhalation, Daily - RT  clopidogrel, 75 mg,  Oral, Daily  donepezil, 10 mg, Oral, Nightly  ferrous sulfate, 325 mg, Oral, Daily With Breakfast  insulin lispro, 2-7 Units, Subcutaneous, 4x Daily AC & at Bedtime  ipratropium, 0.5 mg, Nebulization, 4x Daily - RT  isosorbide mononitrate, 90 mg, Oral, Daily  lamoTRIgine, 100 mg, Oral, Daily  lamoTRIgine, 25 mg, Oral, Nightly  levothyroxine, 75 mcg, Oral, Daily  [Held by provider] lisinopril, 10 mg, Oral, Q24H  memantine, 10 mg, Oral, Q12H  methylPREDNISolone sodium succinate, 40 mg, Intravenous, Q24H  metoprolol succinate XL, 25 mg, Oral, Daily  pantoprazole, 40 mg, Oral, Daily  risperiDONE, 0.5 mg, Oral, BID  rosuvastatin, 40 mg, Oral, Q PM  senna-docusate sodium, 2 tablet, Oral, BID  sertraline, 100 mg, Oral, Daily  sodium chloride, 10 mL, Intravenous, Q12H  sodium chloride, 10 mL, Intravenous, Q12H  Vitamin D3, 50,000 Units, Oral, Q7 Days           Assessment:  Atrial fibrillation with fast ventricular response  Coronary artery disease status post bypass  Hypertension      Recommendations:  Rate less than 100 for the most part on current dose of metoprolol.  Appropriately anticoagulated with full dose Eliquis  And continue Plavix and rosuvastatin  Blood pressure soft on current medications.    Case discussed with Dr. Turcios and plan of care reflects his recommendations.        Electronically signed by PAULA Crocker, 01/08/24, 2:16 PM EST.               12-Dec-2019 22:56

## 2025-01-05 NOTE — PROGRESS NOTE ADULT - PROBLEM SELECTOR PLAN 3
-likely 2/2 worsening RASHAWN. Presented with K of 6 and this AM was at 5.2. ddx: adrenal insufficiency given hyponatremia, hyperkalemia and hypoglycemia. EKG with known 1st degree AV block  -s/p albuterol, lokelma 10mg  -f/u repeat BMP, AM cortisol self-care/home management -likely 2/2 worsening RASHWAN 2/2 to bactrim use. Presented with K of 6 and this AM was at 5.2. ddx: iatrogenic, adrenal insufficiency given hyponatremia, hyperkalemia and hypoglycemia. EKG with known 1st degree AV block  -s/p albuterol, lokelma 10mg  -f/u repeat BMP, AM cortisol